# Patient Record
Sex: MALE | Race: WHITE | Employment: OTHER | URBAN - NONMETROPOLITAN AREA
[De-identification: names, ages, dates, MRNs, and addresses within clinical notes are randomized per-mention and may not be internally consistent; named-entity substitution may affect disease eponyms.]

---

## 2017-01-06 ENCOUNTER — OFFICE VISIT (OUTPATIENT)
Dept: CARE COORDINATION | Facility: OTHER | Age: 37
End: 2017-01-06
Attending: PSYCHIATRY & NEUROLOGY
Payer: COMMERCIAL

## 2017-01-06 DIAGNOSIS — R26.0 ATAXIC GAIT: Primary | ICD-10-CM

## 2017-01-10 ENCOUNTER — HOSPITAL ENCOUNTER (OUTPATIENT)
Dept: MRI IMAGING | Facility: HOSPITAL | Age: 37
Discharge: HOME | End: 2017-01-10
Attending: PSYCHIATRY & NEUROLOGY | Admitting: PSYCHIATRY & NEUROLOGY
Payer: COMMERCIAL

## 2017-01-10 PROCEDURE — 70553 MRI BRAIN STEM W/O & W/DYE: CPT | Mod: TC

## 2017-01-10 PROCEDURE — A9585 GADOBUTROL INJECTION: HCPCS | Mod: TC

## 2017-01-10 PROCEDURE — 72156 MRI NECK SPINE W/O & W/DYE: CPT | Mod: TC

## 2017-01-10 RX ORDER — GADOBUTROL 604.72 MG/ML
7.5 INJECTION INTRAVENOUS ONCE
Status: COMPLETED | OUTPATIENT
Start: 2017-01-10 | End: 2017-01-10

## 2017-01-10 RX ADMIN — GADOBUTROL 7.5 ML: 604.72 INJECTION INTRAVENOUS at 13:22

## 2017-02-28 ENCOUNTER — TRANSFERRED RECORDS (OUTPATIENT)
Dept: HEALTH INFORMATION MANAGEMENT | Facility: HOSPITAL | Age: 37
End: 2017-02-28

## 2017-03-28 ENCOUNTER — TRANSFERRED RECORDS (OUTPATIENT)
Dept: HEALTH INFORMATION MANAGEMENT | Facility: CLINIC | Age: 37
End: 2017-03-28

## 2017-05-09 ENCOUNTER — TRANSFERRED RECORDS (OUTPATIENT)
Dept: HEALTH INFORMATION MANAGEMENT | Facility: CLINIC | Age: 37
End: 2017-05-09

## 2017-05-31 ENCOUNTER — OFFICE VISIT (OUTPATIENT)
Dept: FAMILY MEDICINE | Facility: OTHER | Age: 37
End: 2017-05-31
Attending: FAMILY MEDICINE
Payer: COMMERCIAL

## 2017-05-31 VITALS
RESPIRATION RATE: 20 BRPM | OXYGEN SATURATION: 99 % | SYSTOLIC BLOOD PRESSURE: 128 MMHG | WEIGHT: 174 LBS | BODY MASS INDEX: 23.6 KG/M2 | HEART RATE: 103 BPM | DIASTOLIC BLOOD PRESSURE: 78 MMHG

## 2017-05-31 DIAGNOSIS — G40.909 SEIZURE DISORDER (H): Primary | ICD-10-CM

## 2017-05-31 PROCEDURE — 99213 OFFICE O/P EST LOW 20 MIN: CPT | Performed by: FAMILY MEDICINE

## 2017-05-31 RX ORDER — LEVETIRACETAM 250 MG/1
500 TABLET ORAL 2 TIMES DAILY
COMMUNITY
End: 2018-04-24

## 2017-05-31 ASSESSMENT — PAIN SCALES - GENERAL: PAINLEVEL: NO PAIN (0)

## 2017-05-31 ASSESSMENT — ANXIETY QUESTIONNAIRES
2. NOT BEING ABLE TO STOP OR CONTROL WORRYING: NOT AT ALL
7. FEELING AFRAID AS IF SOMETHING AWFUL MIGHT HAPPEN: NOT AT ALL
GAD7 TOTAL SCORE: 2
3. WORRYING TOO MUCH ABOUT DIFFERENT THINGS: SEVERAL DAYS
6. BECOMING EASILY ANNOYED OR IRRITABLE: NOT AT ALL
5. BEING SO RESTLESS THAT IT IS HARD TO SIT STILL: NOT AT ALL
1. FEELING NERVOUS, ANXIOUS, OR ON EDGE: NOT AT ALL
IF YOU CHECKED OFF ANY PROBLEMS ON THIS QUESTIONNAIRE, HOW DIFFICULT HAVE THESE PROBLEMS MADE IT FOR YOU TO DO YOUR WORK, TAKE CARE OF THINGS AT HOME, OR GET ALONG WITH OTHER PEOPLE: SOMEWHAT DIFFICULT

## 2017-05-31 ASSESSMENT — PATIENT HEALTH QUESTIONNAIRE - PHQ9: 5. POOR APPETITE OR OVEREATING: SEVERAL DAYS

## 2017-05-31 NOTE — MR AVS SNAPSHOT
"              After Visit Summary   2017    Mian Greenfield    MRN: [id number]           Patient Information     Date Of Birth          1980        Visit Information        Provider Department      2017 10:40 AM Torsten Lawrence MD [facility]        Today's Diagnoses     Seizure disorder (H)    -  1      Care Instructions    Form completed          Follow-ups after your visit        Follow-up notes from your care team     Return if symptoms worsen or fail to improve.      Who to contact     If you have questions or need follow up information about today's clinic visit or your schedule please contact [facility] directly at [phone].  Normal or non-critical lab and imaging results will be communicated to you by MyChart, letter or phone within 4 business days after the clinic has received the results. If you do not hear from us within 7 days, please contact the clinic through MyChart or phone. If you have a critical or abnormal lab result, we will notify you by phone as soon as possible.  Submit refill requests through [facility] or call your pharmacy and they will forward the refill request to us. Please allow 3 business days for your refill to be completed.          Additional Information About Your Visit        MyChart Information     [facility] lets you send messages to your doctor, view your test results, renew your prescriptions, schedule appointments and more. To sign up, go to [email or web address] . Click on \"Log in\" on the left side of the screen, which will take you to the Welcome page. Then click on \"Sign up Now\" on the right side of the page.     You will be asked to enter the access code listed below, as well as some personal information. Please follow the directions to create your username and password.     Your access code is: [email or web address]  Expires: 2017 10:38 AM     Your access code will  in 90 days. If you need help or a new code, please call your " [facility] or [phone].        Care EveryWhere ID     This is your Care EveryWhere ID. This could be used by other organizations to access your [place] medical records  [id number]        Your Vitals Were     Pulse Respirations Pulse Oximetry BMI (Body Mass Index)          103 20 99% 23.6 kg/m2         Blood Pressure from Last 3 Encounters:   05/31/17 128/78   11/30/16 136/88   04/18/16 130/80    Weight from Last 3 Encounters:   05/31/17 174 lb (78.9 kg)   11/30/16 170 lb (77.1 kg)   04/18/16 185 lb (83.9 kg)              Today, you had the following     No orders found for display       Primary Care Provider Office Phone # Fax #    Althea Culp [phone]       [facility] [address] [place] [address] [place] [address]        Thank you!     Thank you for choosing [facility]  for your care. Our goal is always to provide you with excellent care. Hearing back from our patients is one way we can continue to improve our services. Please take a few minutes to complete the written survey that you may receive in the mail after your visit with us. Thank you!             Your Updated Medication List - Protect others around you: Learn how to safely use, store and throw away your medicines at www.disposemymeds.org.          This list is accurate as of: 5/31/17 11:59 PM.  Always use your most recent med list.                   Brand Name Dispense Instructions for use    levETIRAcetam 250 MG tablet    KEPPRA     Take 250 mg by mouth 2 times daily       multivitamin, therapeutic with minerals Tabs tablet     30 each    Take 1 tablet by mouth daily

## 2017-05-31 NOTE — PROGRESS NOTES
SUBJECTIVE:  Mian Greenfield, 37 year old, male is seen in follow up of seizure disorder.  He has been on Lamictal and has had several episodes of dizziness and unsteadiness. This resulted in him being picked up at work.  He has been taking his medications.  No premonitory symptoms. He has been seen by Neurology.  Driving form to be completed.    Denies fever, headache, visual disturbance, focal weakness, numbness, tingling, paresthesias, tremor, or gait disturbance.      Current Outpatient Prescriptions   Medication Sig Dispense Refill     levETIRAcetam (KEPPRA) 250 MG tablet Take 250 mg by mouth 2 times daily       multivitamin, therapeutic with minerals (THERA-VIT-M) TABS Take 1 tablet by mouth daily 30 each 0        Allergies   Allergen Reactions     Bee Venom        Past Medical History:   Diagnosis Date     Alcohol abuse 10/3/2013     Insomnia 7/29/2015     Raynaud's syndrome 01/31/2011     Scoliosis (and kyphoscoliosis), idiopathic 04/03/2001     Seizure disorder (H) 7/29/2015    Dr chavez; Lamictal; after 2 years, can try tapering     Past Surgical History:   Procedure Laterality Date     NO HISTORY OF SURGERY       Family History   Problem Relation Age of Onset     Depression Mother      Thyroid Disease Sister      Thyroid Disease Sister      Blood Disease Father      bacterial infection     Alcohol/Drug Father      Social History     Social History     Marital status: Single     Spouse name: N/A     Number of children: N/A     Years of education: N/A     Occupational History     Not on file.     Social History Main Topics     Smoking status: Never Smoker     Smokeless tobacco: Never Used     Alcohol use Yes      Comment: daily use often to intoxication.  Currently in recovery (9/10/2014) Had 1 relapse.     Drug use: No     Sexual activity: Yes     Partners: Female     Other Topics Concern     Parent/Sibling W/ Cabg, Mi Or Angioplasty Before 65f 55m? No      Service No     Blood Transfusions Yes      Permits if needed     Caffeine Concern Yes     1 soda     Occupational Exposure No     Hobby Hazards No     Sleep Concern No     Stress Concern No     Weight Concern No     Special Diet No     Back Care No     Exercise No     Seat Belt Yes     Social History Narrative    HS graduate. No college. 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 Recently . One child who is 2 years old.          Review Of Systems  Constitutional, HEENT, cardiovascular, pulmonary, gi and gu systems are negative, except as otherwise noted.    OBJECTIVE:  Vitals: B/P: 136/88, T: 98.2, P: 119, R: 16    Exam:  Physical Exam   Constitutional: He is oriented to person, place, and time. He appears well-developed and well-nourished. No distress.   Neurological: He is alert and oriented to person, place, and time.   Psychiatric: He has a normal mood and affect.     Other exam not repeated    Labs:  Office Visit on 07/29/2015   Component Date Value Ref Range Status     AST 07/29/2015 28  0 - 45 U/L Final     ALT 07/29/2015 58  0 - 70 U/L Final       ASSESSMENT/PLAN:  Seizure disorder (H)   Appointment with Neurology scheduled for evaluation and recommendations for further management.               Torsten Lawrence MD

## 2017-05-31 NOTE — NURSING NOTE
Chief Complaint   Patient presents with     Forms     needs form completed for the DMV.      Seizures     follow up from visit with Mary       Initial /78  Pulse 103  Resp 20  Wt 174 lb (78.9 kg)  SpO2 99%  BMI 23.6 kg/m2 Estimated body mass index is 23.6 kg/(m^2) as calculated from the following:    Height as of 4/18/16: 6' (1.829 m).    Weight as of this encounter: 174 lb (78.9 kg).  Medication Reconciliation: complete   Jailene Montenegro

## 2017-06-01 ASSESSMENT — ANXIETY QUESTIONNAIRES: GAD7 TOTAL SCORE: 2

## 2017-06-01 ASSESSMENT — PATIENT HEALTH QUESTIONNAIRE - PHQ9: SUM OF ALL RESPONSES TO PHQ QUESTIONS 1-9: 2

## 2017-06-14 ENCOUNTER — TELEPHONE (OUTPATIENT)
Dept: FAMILY MEDICINE | Facility: OTHER | Age: 37
End: 2017-06-14

## 2017-06-14 NOTE — TELEPHONE ENCOUNTER
9:13 AM    Reason for Call: Phone Call    Description: Mian would like a call back about his return to work for [employer]    Was an appointment offered for this call? No    Preferred method for responding to this message: [phone]    If we cannot reach you directly, may we leave a detailed response at the number you provided? Yes    Can this message wait until your PCP/provider returns, if available today?  Yes , Patient was informed that Dr Lawrence is out until Monday    Liana Cruz

## 2017-08-31 ENCOUNTER — PRE VISIT (OUTPATIENT)
Dept: NEUROLOGY | Facility: CLINIC | Age: 37
End: 2017-08-31

## 2017-08-31 NOTE — TELEPHONE ENCOUNTER
1.  Date/reason for appt:  9/12/17   Abnormal Movements    2.  Referring provider:  Shruthi Levy  --  Records are in [facility]; Called and requested imaging be pushed down asap    3.  Call to patient (Yes / No - short description):  No referred  Previously followed [facility] [place]  --  Records are in [facility] (verified no addl records)    Records reviewed.  All records are in [facility] and imaging is in PACS.

## 2017-09-12 ENCOUNTER — OFFICE VISIT (OUTPATIENT)
Dept: NEUROLOGY | Facility: CLINIC | Age: 37
End: 2017-09-12

## 2017-09-12 VITALS — HEART RATE: 93 BPM | DIASTOLIC BLOOD PRESSURE: 90 MMHG | SYSTOLIC BLOOD PRESSURE: 143 MMHG | HEIGHT: 72 IN

## 2017-09-12 DIAGNOSIS — G40.309 NONINTRACTABLE GENERALIZED IDIOPATHIC EPILEPSY WITHOUT STATUS EPILEPTICUS (H): ICD-10-CM

## 2017-09-12 DIAGNOSIS — R27.0 ATAXIA: Primary | ICD-10-CM

## 2017-09-12 ASSESSMENT — ENCOUNTER SYMPTOMS
NUMBNESS: 0
WEIGHT LOSS: 0
INCREASED ENERGY: 1
HEADACHES: 0
DEPRESSION: 1
CHILLS: 0
SEIZURES: 0
NERVOUS/ANXIOUS: 0
PARALYSIS: 0
NIGHT SWEATS: 1
POLYDIPSIA: 0
LOSS OF CONSCIOUSNESS: 0
DECREASED APPETITE: 1
DECREASED CONCENTRATION: 0
WEIGHT GAIN: 0
SPEECH CHANGE: 1
ALTERED TEMPERATURE REGULATION: 0
FATIGUE: 1
WEAKNESS: 1
DIZZINESS: 1
MEMORY LOSS: 0
DISTURBANCES IN COORDINATION: 1
TINGLING: 0
INSOMNIA: 1
PANIC: 0
TREMORS: 1
FEVER: 0
HALLUCINATIONS: 0
POLYPHAGIA: 0

## 2017-09-12 ASSESSMENT — PAIN SCALES - GENERAL: PAINLEVEL: NO PAIN (0)

## 2017-09-12 NOTE — MR AVS SNAPSHOT
After Visit Summary   9/12/2017    Mian Greenfield    MRN: [id number]           Patient Information     Date Of Birth          1980        Visit Information        Provider Department      9/12/2017 3:00 PM Anthony Velázquez MD [facility] Neurology        Today's Diagnoses     Ataxia    -  1    Nonintractable generalized idiopathic epilepsy without status epilepticus (H)           Follow-ups after your visit        Additional Services     NEUROLOGY ADULT REFERRAL       Your provider has referred you for the following:   Ataxia clinic    Please be aware that coverage of these services is subject to the terms and limitations of your health insurance plan.  Call member services at your health plan with any benefit or coverage questions.      Please bring the following with you to your appointment:    (1) Any X-Rays, CTs or MRIs which have been performed.  Contact the facility where they were done to arrange for  prior to your scheduled appointment.    (2) List of current medications  (3) This referral request   (4) Any documents/labs given to you for this referral                  Follow-up notes from your care team     Return in about 4 weeks (around 10/10/2017).      Future tests that were ordered for you today     Open Future Orders        Priority Expected Expires Ordered    Vitamin B1 whole blood Routine  9/12/2018 9/12/2017    Lyme Disease Nini with reflex to WB Serum Routine  9/12/2018 9/12/2017    Vitamin B12 Routine  9/12/2018 9/12/2017    Ceruloplasmin Routine  9/12/2018 9/12/2017    CBC with platelets Routine  9/12/2018 9/12/2017    Vitamin E Routine  9/12/2018 9/12/2017    Erythrocyte sedimentation rate auto Routine  9/12/2018 9/12/2017    EEG ROUTINE (41907) Routine  10/10/2017 9/12/2017    Lipid panel Routine  9/12/2018 9/12/2017            Who to contact     Please call your clinic at [phone] to:    Ask questions about your health    Make or cancel appointments    Discuss your  medicines    Learn about your test results    Speak to your doctor   If you have compliments or concerns about an experience at your clinic, or if you wish to file a complaint, please contact [facility] Patient Relations at [phone] or email us at [email or web address]         Additional Information About Your Visit        [employer] Information     [employer] is an electronic gateway that provides easy, online access to your medical records. With [employer], you can request a clinic appointment, read your test results, renew a prescription or communicate with your care team.     To sign up for [employer] visit the website at [email or web address]   You will be asked to enter the access code listed below, as well as some personal information. Please follow the directions to create your username and password.     Your access code is: [email or web address]  Expires: 2017  4:49 PM     Your access code will  in 90 days. If you need help or a new code, please contact your [facility] or call [phone] for assistance.        Care EveryWhere ID     This is your Care EveryWhere ID. This could be used by other organizations to access your [place] medical records  [id number]        Your Vitals Were     Pulse Height                93 1.829 m (6')           Blood Pressure from Last 3 Encounters:   17 143/90   17 128/78   16 136/88    Weight from Last 3 Encounters:   17 78.9 kg (174 lb)   16 77.1 kg (170 lb)   16 83.9 kg (185 lb)              We Performed the Following     Keppra (Levetiracetam) Level     NEUROLOGY ADULT REFERRAL     [place] virus RNA by PCR        Primary Care Provider Office Phone # Fax #    Torsten Lawrecne [phone]       [facility] [address] [place] [address]        Equal Access to Services     CANDIE ROSS : Guicho Perea, piper walker, temoybalfredo  jess montesbritt ballard. So [facility] [phone].    ATENCIÓN: Si singh donohue, tiene a hopkins disposición servicios gratuitos de asistencia lingüística. Meliton al [phone].    We comply with applicable federal civil rights laws and [place] laws. We do not discriminate on the basis of race, color, national origin, age, disability sex, sexual orientation or gender identity.            Thank you!     Thank you for choosing [facility]  for your care. Our goal is always to provide you with excellent care. Hearing back from our patients is one way we can continue to improve our services. Please take a few minutes to complete the written survey that you may receive in the mail after your visit with us. Thank you!             Your Updated Medication List - Protect others around you: Learn how to safely use, store and throw away your medicines at www.disposemymeds.org.          This list is accurate as of: 9/12/17  4:49 PM.  Always use your most recent med list.                   Brand Name Dispense Instructions for use Diagnosis    levETIRAcetam 250 MG tablet    KEPPRA     Take 500 mg by mouth 2 times daily        multivitamin, therapeutic with minerals Tabs tablet     30 each    Take 1 tablet by mouth daily    Alcohol abuse
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
## 2017-09-14 ENCOUNTER — TELEPHONE (OUTPATIENT)
Dept: NEUROLOGY | Facility: CLINIC | Age: 37
End: 2017-09-14

## 2017-09-14 NOTE — TELEPHONE ENCOUNTER
Form was received that was mailed into the clinic request and medical opinion. Form filled out placed in the M.D.'s folder for signature.

## 2017-09-19 ENCOUNTER — TELEPHONE (OUTPATIENT)
Dept: NEUROLOGY | Facility: CLINIC | Age: 37
End: 2017-09-19

## 2017-09-19 NOTE — TELEPHONE ENCOUNTER
"Called pt to arrange an appointment with  in the Ataxia Clinic. He said he doesn't have insurance and wants to make sure that he does have insurance before he make an appointment.  He is going to speak to his \"worker\" tomorrow and he asked to be called back on Friday in regards to scheduling.  "

## 2017-09-26 ENCOUNTER — TELEPHONE (OUTPATIENT)
Dept: NEUROLOGY | Facility: CLINIC | Age: 37
End: 2017-09-26

## 2017-09-26 NOTE — TELEPHONE ENCOUNTER
Called pt, no answer, left message in regards to scheduling pt as a new ataxia pt, pt did not answer.

## 2017-09-28 ENCOUNTER — TELEPHONE (OUTPATIENT)
Dept: FAMILY MEDICINE | Facility: OTHER | Age: 37
End: 2017-09-28

## 2017-09-28 NOTE — TELEPHONE ENCOUNTER
Dr. Anthony Velázquez from [facility] called regarding pt he saw. He states he did not return for labs or appt and would like to discuss him with you. Please call back at [phone].

## 2017-10-03 RX ORDER — LANOLIN ALCOHOL/MO/W.PET/CERES
100 CREAM (GRAM) TOPICAL DAILY
Qty: 1 TABLET | Refills: 3 | Status: SHIPPED | OUTPATIENT
Start: 2017-10-03 | End: 2021-03-24

## 2017-10-24 ENCOUNTER — TELEPHONE (OUTPATIENT)
Dept: NEUROLOGY | Facility: CLINIC | Age: 37
End: 2017-10-24

## 2017-10-24 NOTE — TELEPHONE ENCOUNTER
Called patient again to try and arrange an appointment. No answer, left voice mail to return call.

## 2018-04-18 ENCOUNTER — OFFICE VISIT (OUTPATIENT)
Dept: FAMILY MEDICINE | Facility: OTHER | Age: 38
End: 2018-04-18
Attending: FAMILY MEDICINE
Payer: COMMERCIAL

## 2018-04-18 VITALS
TEMPERATURE: 98.6 F | WEIGHT: 183 LBS | HEART RATE: 97 BPM | DIASTOLIC BLOOD PRESSURE: 82 MMHG | BODY MASS INDEX: 24.82 KG/M2 | OXYGEN SATURATION: 99 % | SYSTOLIC BLOOD PRESSURE: 130 MMHG

## 2018-04-18 DIAGNOSIS — G40.909 SEIZURE DISORDER (H): Primary | ICD-10-CM

## 2018-04-18 PROCEDURE — G0463 HOSPITAL OUTPT CLINIC VISIT: HCPCS

## 2018-04-18 PROCEDURE — 99214 OFFICE O/P EST MOD 30 MIN: CPT | Performed by: FAMILY MEDICINE

## 2018-04-18 ASSESSMENT — ANXIETY QUESTIONNAIRES
3. WORRYING TOO MUCH ABOUT DIFFERENT THINGS: SEVERAL DAYS
1. FEELING NERVOUS, ANXIOUS, OR ON EDGE: SEVERAL DAYS
IF YOU CHECKED OFF ANY PROBLEMS ON THIS QUESTIONNAIRE, HOW DIFFICULT HAVE THESE PROBLEMS MADE IT FOR YOU TO DO YOUR WORK, TAKE CARE OF THINGS AT HOME, OR GET ALONG WITH OTHER PEOPLE: SOMEWHAT DIFFICULT
6. BECOMING EASILY ANNOYED OR IRRITABLE: SEVERAL DAYS
2. NOT BEING ABLE TO STOP OR CONTROL WORRYING: SEVERAL DAYS
4. TROUBLE RELAXING: SEVERAL DAYS
7. FEELING AFRAID AS IF SOMETHING AWFUL MIGHT HAPPEN: SEVERAL DAYS
5. BEING SO RESTLESS THAT IT IS HARD TO SIT STILL: SEVERAL DAYS
GAD7 TOTAL SCORE: 7

## 2018-04-18 ASSESSMENT — PAIN SCALES - GENERAL: PAINLEVEL: NO PAIN (0)

## 2018-04-18 NOTE — MR AVS SNAPSHOT
"              After Visit Summary   2018    Mian Greenfield    MRN: [id number]           Patient Information     Date Of Birth          1980        Visit Information        Provider Department      2018 8:40 AM Torsten Lawrence MD [facility]        Today's Diagnoses     Seizure disorder (H)    -  1      Care Instructions    Continue same medications.            Follow-ups after your visit        Follow-up notes from your care team     Return in about 6 months (around 10/18/2018) for Follow up visit.      Who to contact     If you have questions or need follow up information about today's clinic visit or your schedule please contact [facility] directly at [phone].  Normal or non-critical lab and imaging results will be communicated to you by MyChart, letter or phone within 4 business days after the clinic has received the results. If you do not hear from us within 7 days, please contact the clinic through MyChart or phone. If you have a critical or abnormal lab result, we will notify you by phone as soon as possible.  Submit refill requests through [employer] or call your pharmacy and they will forward the refill request to us. Please allow 3 business days for your refill to be completed.          Additional Information About Your Visit        MyChart Information     [employer] lets you send messages to your doctor, view your test results, renew your prescriptions, schedule appointments and more. To sign up, go to [email or web address] . Click on \"Log in\" on the left side of the screen, which will take you to the Welcome page. Then click on \"Sign up Now\" on the right side of the page.     You will be asked to enter the access code listed below, as well as some personal information. Please follow the directions to create your username and password.     Your access code is: [email or web address]  Expires: 2018  5:16 AM     Your access code will  in 90 days. If you need " help or a new code, please call your [facility] clinic or [phone].        Care EveryWhere ID     This is your Care EveryWhere ID. This could be used by other organizations to access your [facility] medical records  [id number]        Your Vitals Were     Pulse Temperature Pulse Oximetry BMI (Body Mass Index)          97 98.6  F (37  C) (Tympanic) 99% 24.82 kg/m2         Blood Pressure from Last 3 Encounters:   04/18/18 130/82   09/12/17 143/90   05/31/17 128/78    Weight from Last 3 Encounters:   04/18/18 183 lb (83 kg)   05/31/17 174 lb (78.9 kg)   11/30/16 170 lb (77.1 kg)              Today, you had the following     No orders found for display         Today's Medication Changes          These changes are accurate as of 4/18/18 11:59 PM.  If you have any questions, ask your nurse or doctor.               These medicines have changed or have updated prescriptions.        Dose/Directions    levETIRAcetam 500 MG tablet   Commonly known as:  KEPPRA   This may have changed:  medication strength   Used for:  Seizure disorder (H)   Changed by:  Torsten Lawrence MD        Dose:  500 mg   Take 1 tablet (500 mg) by mouth 2 times daily   Quantity:  180 tablet   Refills:  1            Where to get your medicines      These medications were sent to [facility]- [place], [place] - [address] [address]     Phone:  [phone]     levETIRAcetam 500 MG tablet                Primary Care Provider Office Phone # Fax #    Torsten Lawrence [phone]       [address] [place] [address]        Equal Access to Services     [facility] AH: Hadii oriana Perea, waerinda luqadaha, qaybta kaalmajess alvarado. So [facility] [phone].    ATENCIÓN: Si habla español, tiene a hopkins disposición servicios gratuitos de asistencia lingüística. Llame al [phone].    We comply with applicable federal civil rights laws  and [place] laws. We do not discriminate on the basis of race, color, national origin, age, disability, sex, sexual orientation, or gender identity.            Thank you!     Thank you for choosing [facility]  [facility] your [facility]. Our goal is always to provide you with excellent care. Hearing back from our patients is one way we can continue to improve our services. Please take a few minutes to complete the written survey that you may receive in the mail after your visit with us. Thank you!             Your Updated Medication List - Protect others around you: Learn how to safely use, store and throw away your medicines at www.disposemymeds.org.          This list is accurate as of 4/18/18 11:59 PM.  Always use your most recent med list.                   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

## 2018-04-18 NOTE — LETTER
[facility] [place]  [address]  [facility] [address]  Phone: [phone]    April 18, 2018        Mian Greenfield  [address] [facility] [address]          To whom it may concern:    RE: Mian Greenfield    Patient was seen and treated today at our clinic.  Mian suffers from seizure disorder with abnormal movement.  This has been present over the last several months and he has been undergoing Neurological evaluation and has been referred to the [facility].  He is now undergoing disability evaluation.  Because of the nature of his illness he is unable to work.  This has resulted in a limited income for Mian.    I appreciate any help you might be able to give him.    Please contact me for questions or concerns.      Sincerely,        Torsten Lawrence MD

## 2018-04-18 NOTE — NURSING NOTE
Chief Complaint   Patient presents with     Seizures     Follow up would like to discuss recent appointment. Denies and recent seizures.        Initial /82 (BP Location: Right arm, Patient Position: Chair, Cuff Size: Adult Regular)  Pulse 97  Temp 98.6  F (37  C) (Tympanic)  Wt 183 lb (83 kg)  SpO2 99%  BMI 24.82 kg/m2 Estimated body mass index is 24.82 kg/(m^2) as calculated from the following:    Height as of 9/12/17: 6' (1.829 m).    Weight as of this encounter: 183 lb (83 kg).  Medication Reconciliation: complete   EDUARDO MCELROY

## 2018-04-18 NOTE — PROGRESS NOTES
SUBJECTIVE:   Mian Greenfield is a 38 year old male who presents to clinic today for the following health issues:    Seizure disorder      Duration: years    Description (location/character/radiation): abnormal movement and syncope    Intensity:  moderate    Accompanying signs and symptoms: As above    History (similar episodes/previous evaluation): None    Precipitating or alleviating factors: None    Therapies tried and outcome: Medications     Mian has a history of seizure disorder and has been seen by Neurology.  Most recently he will be evaluated at the [facility].  Artur also has been filing for disability.  Records are reviewed.    Problem list and histories reviewed & adjusted, as indicated.  Additional history: as documented    Patient Active Problem List   Diagnosis     Major depression, recurrent (H)     Suicidal ideation     Alcohol abuse     Suicidal behavior     Seizure disorder (H)     Insomnia     Past Surgical History:   Procedure Laterality Date     NO HISTORY OF SURGERY         Social History   Substance Use Topics     Smoking status: Never Smoker     Smokeless tobacco: Never Used     Alcohol use Yes      Comment: daily use often to intoxication.  Currently in recovery (9/10/2014) Had 1 relapse.     Family History   Problem Relation Age of Onset     Depression Mother      Blood Disease Father      bacterial infection     Alcohol/Drug Father      Thyroid Disease Sister      Thyroid Disease Sister          Current Outpatient Prescriptions   Medication Sig Dispense Refill     levETIRAcetam (KEPPRA) 250 MG tablet Take 500 mg by mouth 2 times daily        multivitamin, therapeutic with minerals (THERA-VIT-M) TABS Take 1 tablet by mouth daily 30 each 0     thiamine 100 MG tablet Take 1 tablet (100 mg) by mouth daily 1 tablet 3     Allergies   Allergen Reactions     Bee Venom        Reviewed and updated as needed this visit by clinical staff  Tobacco  Allergies  Meds  Med Hx  Surg Hx  Fam Hx   Soc Hx      Reviewed and updated as needed this visit by Provider         ROS:  Constitutional, HEENT, cardiovascular, pulmonary, gi and gu systems are negative, except as otherwise noted.    OBJECTIVE:     /82 (BP Location: Right arm, Patient Position: Chair, Cuff Size: Adult Regular)  Pulse 97  Temp 98.6  F (37  C) (Tympanic)  Wt 183 lb (83 kg)  SpO2 99%  BMI 24.82 kg/m2  Body mass index is 24.82 kg/(m^2).  Physical Exam   Constitutional: He is oriented to person, place, and time. He appears well-developed and well-nourished. No distress.   Neurological: He is alert and oriented to person, place, and time.   Psychiatric: He has a normal mood and affect.       Other exam not repeated.  Diagnostic Test Results:  none     ASSESSMENT/PLAN:       Seizure disorder (H)  Discussed ongoing evaluations and disability.  He requests letter for court appearance.  Refilled Keppra as written.  Follow up with Neurology.  - levETIRAcetam (KEPPRA) 500 MG tablet; Take 1 tablet (500 mg) by mouth 2 times daily    Greater than 25 minutes spent with patient, of which greater than 50% was spent reviewing pertinent medical records., counseling regarding seizure evaluation, disability hearings, medication managment, as well as coordination of care.          Torsten Lawrence MD  [facility]

## 2018-04-19 ASSESSMENT — PATIENT HEALTH QUESTIONNAIRE - PHQ9: SUM OF ALL RESPONSES TO PHQ QUESTIONS 1-9: 5

## 2018-04-19 ASSESSMENT — ANXIETY QUESTIONNAIRES: GAD7 TOTAL SCORE: 7

## 2018-04-23 ENCOUNTER — TELEPHONE (OUTPATIENT)
Dept: FAMILY MEDICINE | Facility: OTHER | Age: 38
End: 2018-04-23

## 2018-04-23 NOTE — TELEPHONE ENCOUNTER
11:29 AM    Reason for Call: Phone Call    Description: Pt called and states that he would like a call back regarding some questions from his last visit.    Was an appointment offered for this call? No  If yes : Appointment type              Date    Preferred method for responding to this message: Telephone  What is your phone number ?[phone]    If we cannot reach you directly, may we leave a detailed response at the number you provided? Yes    Can this message wait until your PCP/provider returns, if available today? Not applicable, PCP is in     Jamila Sebewaing

## 2018-04-24 RX ORDER — LEVETIRACETAM 500 MG/1
500 TABLET ORAL 2 TIMES DAILY
Qty: 180 TABLET | Refills: 1 | Status: SHIPPED | OUTPATIENT
Start: 2018-04-24 | End: 2018-05-29

## 2018-04-24 RX ORDER — LEVETIRACETAM 250 MG/1
500 TABLET ORAL 2 TIMES DAILY
Qty: 120 TABLET | Refills: 0 | Status: CANCELLED | OUTPATIENT
Start: 2018-04-24

## 2018-04-24 NOTE — TELEPHONE ENCOUNTER
Patient states he was waiting for a letter for court stating whether he can work or not. Has court tomorrow and needs it by end of day. Also said you wee going to fill his Keppra and it was not sent in. Pended if ok.

## 2018-05-29 ENCOUNTER — OFFICE VISIT (OUTPATIENT)
Dept: NEUROLOGY | Facility: CLINIC | Age: 38
End: 2018-05-29
Payer: COMMERCIAL

## 2018-05-29 ENCOUNTER — TELEPHONE (OUTPATIENT)
Dept: NEUROLOGY | Facility: CLINIC | Age: 38
End: 2018-05-29

## 2018-05-29 VITALS
DIASTOLIC BLOOD PRESSURE: 84 MMHG | WEIGHT: 180.3 LBS | BODY MASS INDEX: 24.42 KG/M2 | SYSTOLIC BLOOD PRESSURE: 127 MMHG | HEIGHT: 72 IN | HEART RATE: 98 BPM

## 2018-05-29 DIAGNOSIS — G40.909 SEIZURE DISORDER (H): ICD-10-CM

## 2018-05-29 DIAGNOSIS — R27.9 LACK OF COORDINATION: Primary | ICD-10-CM

## 2018-05-29 DIAGNOSIS — R27.0 ATAXIA: ICD-10-CM

## 2018-05-29 DIAGNOSIS — G40.309 NONINTRACTABLE GENERALIZED IDIOPATHIC EPILEPSY WITHOUT STATUS EPILEPTICUS (H): ICD-10-CM

## 2018-05-29 LAB
CHOLEST SERPL-MCNC: 173 MG/DL
ERYTHROCYTE [DISTWIDTH] IN BLOOD BY AUTOMATED COUNT: 13.2 % (ref 10–15)
ERYTHROCYTE [SEDIMENTATION RATE] IN BLOOD BY WESTERGREN METHOD: 5 MM/H (ref 0–15)
HCT VFR BLD AUTO: 49.9 % (ref 40–53)
HDLC SERPL-MCNC: 36 MG/DL
HGB BLD-MCNC: 16.5 G/DL (ref 13.3–17.7)
LDLC SERPL CALC-MCNC: 115 MG/DL
MCH RBC QN AUTO: 29.5 PG (ref 26.5–33)
MCHC RBC AUTO-ENTMCNC: 33.1 G/DL (ref 31.5–36.5)
MCV RBC AUTO: 89 FL (ref 78–100)
NONHDLC SERPL-MCNC: 136 MG/DL
PLATELET # BLD AUTO: 302 10E9/L (ref 150–450)
RBC # BLD AUTO: 5.6 10E12/L (ref 4.4–5.9)
TRIGL SERPL-MCNC: 107 MG/DL
VIT B12 SERPL-MCNC: 321 PG/ML (ref 193–986)
WBC # BLD AUTO: 6.7 10E9/L (ref 4–11)

## 2018-05-29 RX ORDER — LEVETIRACETAM 500 MG/1
500 TABLET ORAL 2 TIMES DAILY
Qty: 180 TABLET | Refills: 11 | Status: SHIPPED | OUTPATIENT
Start: 2018-05-29 | End: 2018-07-06

## 2018-05-29 ASSESSMENT — PAIN SCALES - GENERAL: PAINLEVEL: MILD PAIN (2)

## 2018-05-29 NOTE — TELEPHONE ENCOUNTER
Pt is referred to the ataxia clinic by Dr. Cruz. He was called and an appointment was made for July 20 at 11am.

## 2018-05-29 NOTE — MR AVS SNAPSHOT
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  Thanks  Kenyatta Velázquez M.D.     May 28. 2018.          Follow-ups after your visit        Additional Services     NEUROLOGY ADULT REFERRAL       Your provider has referred you for the following:   [facility]    Please be aware that coverage of these services is subject to the terms and limitations of your health insurance plan.  Call member services at your health plan with any benefit or coverage questions.      Please bring the following with you to your appointment:    (1) Any X-Rays, CTs or MRIs which have been performed.  Contact the facility where they were done to arrange for  prior to your scheduled appointment.    (2) List of current medications  (3) This referral request   (4) Any documents/labs given to you for this referral                  Follow-up notes from your care team     Return in about 3 months (around 2018).      Your next 10 appointments already scheduled     May 29, 2018  3:00 PM CDT   (Arrive by 2:45 PM)   Return Visit with Kenyatta Velázquez MD   [facility] Neurology ([facility])    [address] [place] [address] [phone]            Aug 28, 2018 11:00 AM CDT   (Arrive by 10:45 AM)   Return Visit with Kenyatta Velázquez MD   [facility] ([facility])    [address] [place] [address]    [phone]              Future tests that were ordered for you today     Open Future Orders        Priority Expected Expires Ordered    EEG ROUTINE (65729) Routine  6/26/2018 5/29/2018            Who to contact     Please call your clinic at [phone] to:    Ask questions about your health    Make or cancel appointments    Discuss your medicines    Learn about your test results    Speak to your doctor            Additional Information About Your Visit        Care EveryWhere ID     This is your Care EveryWhere ID. This could be used by other organizations to access your [facility] medical records  [id number]        Your Vitals Were     Pulse Height BMI (Body Mass Index)             98 1.829 m (6') 24.45 kg/m2          Blood Pressure from Last 3 Encounters:   05/29/18 127/84   04/18/18 130/82   09/12/17 143/90    Weight from Last 3 Encounters:   05/29/18 81.8 kg (180 lb 4.8 oz)   04/18/18 83 kg (183 lb)   05/31/17 78.9 kg (174 lb)              We Performed the Following     NEUROLOGY ADULT REFERRAL          Where to get your medicines      These medications were sent to [facility]- [place], [place]  [place] - [address] [address]     Phone:  [phone]     levETIRAcetam 500 MG tablet          Primary Care Provider Office Phone # Fax #    Torsten Lawrence [phone]       [address] [place] [address]        Equal Access to Services     [facility] AH: Hadii oriana graveso Soamrit, waaxda luqadaha, qaybta kaalmada adeegyada, waxay fredy mora adebritt ballard. So [facility] [phone].    ATENCIÓN: Si habla español, tiene a hopkins disposición servicios gratuitos de asistencia lingüística. Llame al [phone].    We comply with applicable federal civil rights laws and [place] laws. We do not discriminate on the basis of race, color, national origin, age, disability, sex, sexual orientation, or gender identity.            Thank you!      Thank you for choosing [facility] NEUROLOGY  for your care. Our goal is always to provide you with excellent care. Hearing back from our patients is one way we can continue to improve our services. Please take a few minutes to complete the written survey that you may receive in the mail after your visit with us. Thank you!             Your Updated Medication List - Protect others around you: Learn how to safely use, store and throw away your medicines at www.disposemymeds.org.          This list is accurate as of 5/29/18  2:17 PM.  Always use your most recent med list.                   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

## 2018-05-29 NOTE — LETTER
Service Date: 2018      Torsten Lawrence MD   [facility]    [address] [place] [address]      RE: Mian Greenfield   MRN: [id number]   : 1980      Dear Dr. Lawrence:       Once again, I saw Mr. Greenfield.  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  Unfortunately, he did not have any insurance, so he could not have any testing after my 2017 visit, where we outlined an evaluation program for his ataxia, but now he has, so he came back.  We had attempted to get him into the [facility], but for the same reason, he was not able to.  Since the last visit, he says he has had no seizures.  His ataxia, he feels, has been slightly improved due to the fact he reduced his dose of levetiracetam from 1000-500 a day.  He is accompanied by his mother.  We re-reviewed the history, and it is pretty firm that all his problems started in  when he had an episode that was presumed to be a seizure, for which he was put initially on Lamictal and then changed over to Keppra that he is on now.  He says that since that time, his coordination has been a really big problem.  He is unable to do his work as an , and recently he has applied for disability.  Since his last visit, he says he had no other changes.  He feels a little better, and he is not having suicidal ideation or severe depression.  He is able to spend some time with his son.  He has not been drinking.  We have been unable to do any significant workup on his case.  His last MRI from 2017 of his brain and neck did not show any lesions.  He has had a previous MRI by Dr. Hernandez in , and this apparently had not shown a lesion literally.  He has not had any significant laboratory studies except back in  when he did have a Lamictal level and had had some other laboratory studies that go back to .  He has had alcohol issues, but he says he is dry now.  He has also had salicylate levels, not exactly sure  what for.  His other diagnoses are alcohol abuse, suicidal ideation, Raynaud phenomenon with cold weather and insomnia.      ALLERGIES:  Bee venom.      PHYSICAL EXAMINATION:  I reexamined him today.  His speech is still dysarthric.  His blood pressure is 127/84.  He has very profound truncal titubation and a wide-based gait.  He cannot do tandem.  There is some ataxia on finger-to-nose, more prominent on the left, and there is rapid alternating movement and mildly ataxic syndrome.  Nystagmus is not present.  He does have mild ptosis of the right eye and some conjunctival injection that he feels are allergies.  His fundi are benign.  His reflexes are very brisk, but it is not a jaw jerk.  His right-sided reflexes are brisker.  There are 2 beats of clonus.  No Babinski.  Sensory exam is relatively normal.      In summary, he has ataxia with a lot of titubation of his trunk, but some limb component.  He has a history of alcohol.  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  His MRI has not shown vermian atrophy.  He needs to have further studies done for ataxia, and I referred him again to Dr. Slaughter's clinic.  The labs which he could not have before have been done.  In the meantime, we gave him a paper that he is disabled from doing any substantial amount of work.      We will see how his workup progresses.  He probably will get disability.      Sincerely,      Anthony Velázquez MD       cc:   Alanna Slaughter MD   [facility]   [address] [place] [address]       D: 2018   T: 2018   MT: harry    Name:     KARENA BOWMAN   MRN:      [id number]        Account:      [id number]   :      1980           Service Date: 2018    Document: [id number]

## 2018-05-29 NOTE — PATIENT INSTRUCTIONS
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  Thanks  Kenyatta Velázquez M.D.  [phone]   May 28. 2018.

## 2018-05-30 LAB — B BURGDOR IGG+IGM SER QL: 0.04 (ref 0–0.89)

## 2018-05-30 NOTE — PROGRESS NOTES
Service Date: 2018      Torsten Lawrence MD   [facility]    [address] [place] [address]      RE: Mian Greenfield   MRN: [id number]   : 1980      Dear Dr. Lawrence:       Once again, I saw Mr. Greenfield.  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  Unfortunately, he did not have any insurance, so he could not have any testing after my 2017 visit, where we outlined an evaluation program for his ataxia, but now he has, so he came back.  We had attempted to get him into the [facility], but for the same reason, he was not able to.  Since the last visit, he says he has had no seizures.  His ataxia, he feels, has been slightly improved due to the fact he reduced his dose of levetiracetam from 1000-500 a day.  He is accompanied by his mother.  We re-reviewed the history, and it is pretty firm that all his problems started in  when he had an episode that was presumed to be a seizure, for which he was put initially on Lamictal and then changed over to Keppra that he is on now.  He says that since that time, his coordination has been a really big problem.  He is unable to do his work as an , and recently he has applied for disability.  Since his last visit, he says he had no other changes.  He feels a little better, and he is not having suicidal ideation or severe depression.  He is able to spend some time with his son.  He has not been drinking.  We have been unable to do any significant workup on his case.  His last MRI from 2017 of his brain and neck did not show any lesions.  He has had a previous MRI by Dr. Hernandez in , and this apparently had not shown a lesion literally.  He has not had any significant laboratory studies except back in  when he did have a Lamictal level and had had some other laboratory studies that go back to .  He has had alcohol issues, but he says he is dry now.  He has also had salicylate levels, not exactly sure  what for.  His other diagnoses are alcohol abuse, suicidal ideation, Raynaud phenomenon with cold weather and insomnia.      ALLERGIES:  Bee venom.      PHYSICAL EXAMINATION:  I reexamined him today.  His speech is still dysarthric.  His blood pressure is 127/84.  He has very profound truncal titubation and a wide-based gait.  He cannot do tandem.  There is some ataxia on finger-to-nose, more prominent on the left, and there is rapid alternating movement and mildly ataxic syndrome.  Nystagmus is not present.  He does have mild ptosis of the right eye and some conjunctival injection that he feels are allergies.  His fundi are benign.  His reflexes are very brisk, but it is not a jaw jerk.  His right-sided reflexes are brisker.  There are 2 beats of clonus.  No Babinski.  Sensory exam is relatively normal.      In summary, he has ataxia with a lot of titubation of his trunk, but some limb component.  He has a history of alcohol.  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  His MRI has not shown vermian atrophy.  He needs to have further studies done for ataxia, and I referred him again to Dr. Slaughter's clinic.  The labs which he could not have before have been done.  In the meantime, we gave him a paper that he is disabled from doing any substantial amount of work.      We will see how his workup progresses.  He probably will get disability.      Sincerely,      Anthony Mcdonnell MD       cc:   Alanna Slaughter MD   [facility]   [address] [place] [address]         ANTHONY MCDONNELL MD             D: 2018   T: 2018   MT: harry      Name:     KARENA BOWMAN   MRN:      [id number]        Account:      [id number]   :      1980           Service Date: 2018      Document: [id number]

## 2018-05-31 LAB
CERULOPLASMIN SERPL-MCNC: 21 MG/DL (ref 23–53)
LEVETIRACETAM SERPL-MCNC: 6 UG/ML (ref 12–46)

## 2018-06-01 LAB
A-TOCOPHEROL VIT E SERPL-MCNC: 8.8 MG/L (ref 5.5–18)
BETA+GAMMA TOCOPHEROL SERPL-MCNC: 1.1 MG/L (ref 0–6)

## 2018-06-02 LAB
SPECIMEN SOURCE: NORMAL
VIT B1 BLD-MCNC: 132 NMOL/L (ref 70–180)
WNV RNA SER QL NAA+PROBE: NOT DETECTED

## 2018-06-08 ENCOUNTER — TELEPHONE (OUTPATIENT)
Dept: NEUROLOGY | Facility: CLINIC | Age: 38
End: 2018-06-08

## 2018-06-08 NOTE — TELEPHONE ENCOUNTER
Spoke to pt and changed appointment from JUly 20 to July 6th. Dr. Velázquez asked that the appointment get moved up if possible.

## 2018-06-13 ENCOUNTER — TELEPHONE (OUTPATIENT)
Dept: NEUROLOGY | Facility: CLINIC | Age: 38
End: 2018-06-13

## 2018-06-13 NOTE — TELEPHONE ENCOUNTER
[facility] Call [facility]    Phone Message    May a detailed message be left on voicemail: yes    Reason for Call: Other: Pt calling asking if Dr. Velázquez would help him get a handicapped placard. Please call back to discuss steps on how to proceed.     Action Taken: Message routed to:  [facility]

## 2018-06-15 NOTE — TELEPHONE ENCOUNTER
Called patient, and confirmed address on where I can mail the Application for disability placard for his card. I told him to finish filling it out and bring it to the [employer]. Mailing it today [phone] on 6/15/2018

## 2018-07-02 NOTE — TELEPHONE ENCOUNTER
FUTURE VISIT INFORMATION      FUTURE VISIT INFORMATION:    Date: 07/06/2018    Time:     Location:   REFERRAL INFORMATION:    Referring provider:  Anthony Velázquez MD    Referring providers clinic:      Reason for visit/diagnosis        RECORDS STATUS      Internal Records: [facility], Care Everywhere and PACS.     Additional Records Located in the Scanned Images Icon located in Chart Review on both the Encounters and Media Tabs.

## 2018-07-06 ENCOUNTER — TELEPHONE (OUTPATIENT)
Dept: NEUROLOGY | Facility: CLINIC | Age: 38
End: 2018-07-06

## 2018-07-06 ENCOUNTER — OFFICE VISIT (OUTPATIENT)
Dept: NEUROLOGY | Facility: CLINIC | Age: 38
End: 2018-07-06
Payer: COMMERCIAL

## 2018-07-06 ENCOUNTER — CARE COORDINATION (OUTPATIENT)
Dept: NEUROLOGY | Facility: CLINIC | Age: 38
End: 2018-07-06

## 2018-07-06 ENCOUNTER — PRE VISIT (OUTPATIENT)
Dept: NEUROLOGY | Facility: CLINIC | Age: 38
End: 2018-07-06

## 2018-07-06 VITALS
HEART RATE: 94 BPM | HEIGHT: 72 IN | DIASTOLIC BLOOD PRESSURE: 88 MMHG | BODY MASS INDEX: 24.23 KG/M2 | TEMPERATURE: 98.9 F | OXYGEN SATURATION: 99 % | WEIGHT: 178.9 LBS | SYSTOLIC BLOOD PRESSURE: 137 MMHG | RESPIRATION RATE: 20 BRPM

## 2018-07-06 DIAGNOSIS — M62.838 MUSCLE SPASTICITY: ICD-10-CM

## 2018-07-06 DIAGNOSIS — R27.0 ATAXIA: ICD-10-CM

## 2018-07-06 DIAGNOSIS — R27.9 LACK OF COORDINATION: ICD-10-CM

## 2018-07-06 DIAGNOSIS — R27.0 ATAXIA: Primary | ICD-10-CM

## 2018-07-06 DIAGNOSIS — G40.909 SEIZURE DISORDER (H): ICD-10-CM

## 2018-07-06 DIAGNOSIS — M62.838 MUSCLE SPASTICITY: Primary | ICD-10-CM

## 2018-07-06 RX ORDER — LEVETIRACETAM 500 MG/1
1000 TABLET ORAL 2 TIMES DAILY
Qty: 360 TABLET | Refills: 4 | Status: SHIPPED | OUTPATIENT
Start: 2018-07-06 | End: 2018-08-28

## 2018-07-06 ASSESSMENT — ENCOUNTER SYMPTOMS
DEPRESSION: 0
MUSCLE WEAKNESS: 1
PANIC: 0
WEAKNESS: 1
MUSCLE CRAMPS: 1
SPEECH CHANGE: 0
SEIZURES: 0
JOINT SWELLING: 1
NUMBNESS: 0
INSOMNIA: 1
LOSS OF CONSCIOUSNESS: 0
MEMORY LOSS: 0
DECREASED CONCENTRATION: 1
HEADACHES: 0
DISTURBANCES IN COORDINATION: 1
BACK PAIN: 0
TINGLING: 0
ARTHRALGIAS: 1
TREMORS: 1
PARALYSIS: 0
NERVOUS/ANXIOUS: 1
MYALGIAS: 1
STIFFNESS: 1
NECK PAIN: 0
DIZZINESS: 1

## 2018-07-06 ASSESSMENT — PAIN SCALES - GENERAL: PAINLEVEL: NO PAIN (0)
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
## 2018-07-06 NOTE — MR AVS SNAPSHOT
After Visit Summary   7/6/2018    Main Greenfield    MRN: [id number]           Patient Information     Date Of Birth          1980        Visit Information        Provider Department      7/6/2018 11:30 AM Jacky Slaughter MD [facility]        Today's Diagnoses     Ataxia    -  1       Follow-ups after your visit        Your next 10 appointments already scheduled     Aug 28, 2018 11:00 AM CDT   (Arrive by 10:45 AM)   Return Visit with Anthony Velázquez MD   [facility] ([facility] [place])    [address] [place] [address] [phone]              Who to contact     Please call your clinic at [phone] to:    Ask questions about your health    Make or cancel appointments    Discuss your medicines    Learn about your test results    Speak to your doctor            Additional Information About Your Visit        Care EveryWhere ID     This is your Care EveryWhere ID. This could be used by other organizations to access your [place] medical records  [id number]        Your Vitals Were     Pulse Temperature Respirations Height Pulse Oximetry BMI (Body Mass Index)    94 98.9  F (37.2  C) (Oral) 20 1.829 m (6') 99% 24.26 kg/m2       Blood Pressure from Last 3 Encounters:   07/06/18 137/88   05/29/18 127/84   04/18/18 130/82    Weight from Last 3 Encounters:   07/06/18 81.1 kg (178 lb 14.4 oz)   05/29/18 81.8 kg (180 lb 4.8 oz)   04/18/18 83 kg (183 lb)              We Performed the Following     Ceruloplasmin     Glutamic acid decarboxylase antibody     Thyroglobulin and antibody     Thyroid peroxidase antibody          Today's Medication Changes          These changes are accurate as of 7/6/18 11:59 PM.  If you have any questions, ask your nurse or doctor.               These medicines have changed or have updated prescriptions.        Dose/Directions    levETIRAcetam 500 MG tablet   Commonly known as:  KEPPRA   This may have  changed:  how much to take   Used for:  Seizure disorder (H), Lack of coordination   Changed by:  Anthony Velázquez MD        Dose:  1000 mg   Take 2 tablets (1,000 mg) by mouth 2 times daily   Quantity:  360 tablet   Refills:  4            Where to get your medicines      These medications were sent to [facility] - [place], [place] - [address]  [address] [place] [address]     Phone:  [phone]     levETIRAcetam 500 MG tablet                Primary Care Provider Office Phone # Fax #    Torsten Lawrence [phone]       [address] [facility] [address]        Equal Access to Services     AG ROSS : Guicho graveso Soamrit, waaxda luqadaha, qaybta kaalmada bennieyalisy, jess ballard. So [facility] [phone].    ATENCIÓN: Si habla español, tiene a hopkins disposición servicios gratuitos de asistencia lingüística. [facility] [phone].    We comply with applicable federal civil rights laws and [place] laws. We do not discriminate on the basis of race, color, national origin, age, disability, sex, sexual orientation, or gender identity.            Thank you!     Thank you for choosing [facility] NEUROLOGY  for your care. Our goal is always to provide you with excellent care. Hearing back from our patients is one way we can continue to improve our services. Please take a few minutes to complete the written survey that you may receive in the mail after your visit with us. Thank you!             Your Updated Medication List - Protect others around you: Learn how to safely use, store and throw away your medicines at www.disposemymeds.org.          This list is accurate as of 7/6/18 11:59 PM.  Always use your most recent med list.                   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

## 2018-07-06 NOTE — TELEPHONE ENCOUNTER
Called to tell patient that the medicationlevETIRAcetam (KEPPRA) was changed to two 500 mg bid. So 4 pills daily. Dr. Velázquez recommends this and it is sent to his pharmacy for him to .

## 2018-07-06 NOTE — PROGRESS NOTES
Service Date: 2018      SUBJECTIVE:  Karena Greenfield is a 38-year-old man who was referred to the A[facility]ia Clinic by Dr. Velázquez for evaluation of gait ataxia.  He sees Dr. Velázquez for seizure disorder.  He had a seizure in , which was apparently unprovoked.  He had 2 more seizures and is currently well controlled on Keppra.  There was a question of alcohol abuse.  However, according to Mr. Greenfield and his mother the seizure was not provoked by alcohol or alcohol withdrawal and he has not been a heavy drinker.  In his records; however, there was a positive alcohol in  at around 9:50 p.m.  That situation was not clear from the records.      PHYSICAL EXAMINATION:  Mr. Greenfield was a cognitively intact, pleasant gentleman, no dysarthria.  Cranial nerves were unremarkable.  No nystagmus or diplopia.  Extraocular movements were full.  No palatal tremor.  Facial movements and sensation were intact.  Upper and lower extremities showed diffuse hyperreflexia and lower extremity spasticity and some dysmetria of the upper extremities.  His gait was wide-based and ataxic.  Plantar responses were equivocal bilaterally.  Sensory examination was unremarkable.  The MRI of the brain does show cerebellar atrophy, more so in the vermis region.  I did discuss with Mr. Greenfield that we will check for treatable causes of ataxia, including PIA antibodies.  However, he will need to have next-generation sequencing to rule out SPG type of picture.  He was already checked for B1, B12 and vitamin E which were all negative.  He was found to have slightly low ceruloplasmin and I will repeat that today.  I will see him back in the clinic as planned.         QUE HARRIS MD             D: 2018   T: 2018   MT: salvador      Name:     KARENA GREENFIELD   MRN:      [id number]        Account:      [id number]   :      1980           Service Date: 2018      Document: [id number]

## 2018-07-06 NOTE — LETTER
7/6/2018       RE: Mian Greenfield  [address] [address]     Dear Colleague,    Thank you for referring your patient, Mian Greenfield, to the [facility] NEUROLOG[facility] at [facility]. Please see a copy of my visit note below.      Service Date: 07/06/2018      SUBJECTIVE:  Mian Greenfield is a 38-year-old man who was referred to the Ataxia Clinic by Dr. Velázquez for evaluation of gait ataxia.  He sees Dr. Velázquez for seizure disorder.  He had a seizure in 2015, which was apparently unprovoked.  He had 2 more seizures and is currently well controlled on Keppra.  There was a question of alcohol abuse.  However, according to Mr. Greenfield and his mother the seizure was not provoked by alcohol or alcohol withdrawal and he has not been a heavy drinker.  In his records; however, there was a positive alcohol in 2013 at around 9:50 p.m.  That situation was not clear from the records.      PHYSICAL EXAMINATION:  Mr. Greenfield was a cognitively intact, pleasant gentleman, no dysarthria.  Cranial nerves were unremarkable.  No nystagmus or diplopia.  Extraocular movements were full.  No palatal tremor.  Facial movements and sensation were intact.  Upper and lower extremities showed diffuse hyperreflexia and lower extremity spasticity and some dysmetria of the upper extremities.  His gait was wide-based and ataxic.  Plantar responses were equivocal bilaterally.  Sensory examination was unremarkable.  The MRI of the brain does show cerebellar atrophy, more so in the vermis region.  I did discuss with Mr. Greenfield that we will check for treatable causes of ataxia, including PIA antibodies.  However, he will need to have next-generation sequencing to rule out SPG type of picture.  He was already checked for B1, B12 and vitamin E which were all negative.  He was found to have slightly low ceruloplasmin and I will repeat that today.  I will see him back in the clinic as planned.         D: 07/06/2018   T:  2018   MT: tb      Name:     KARENA BOWMAN   MRN:      [id number]        Account:      [id number]   :      1980           Service Date: 2018      Document: [id number]         Again, thank you for allowing me to participate in the care of your patient.      Sincerely,    Jacky Slaughter MD

## 2018-07-06 NOTE — NURSING NOTE
Chief Complaint   Patient presents with     Consult     [facility] NEW - CONSULT ATAXIA     Rosemary Sosa MA

## 2018-07-06 NOTE — MR AVS SNAPSHOT
After Visit Summary   7/6/2018    Mian Greenfield    MRN: [id number]           Patient Information     Date Of Birth          1980        Visit Information        Provider Department      7/6/2018 11:30 AM Ben Austin GC [facility]rology        Today's Diagnoses     Muscle spasticity    -  1    Ataxia           Follow-ups after your visit        Your next 10 appointments already scheduled     Aug 28, 2018 11:00 AM CDT   (Arrive by 10:45 AM)   Return Visit with Anthony Velázquez MD   [facility] ([facility] [place])    [address] [address]   [phone]              Who to contact     Please call your clinic at [phone] to:    Ask questions about your health    Make or cancel appointments    Discuss your medicines    Learn about your test results    Speak to your doctor            Additional Information About Your Visit        Care EveryWhere ID     This is your Care EveryWhere ID. This could be used by other organizations to access your [place] medical records  [id number]         Blood Pressure from Last 3 Encounters:   07/06/18 137/88   05/29/18 127/84   04/18/18 130/82    Weight from Last 3 Encounters:   07/06/18 81.1 kg (178 lb 14.4 oz)   05/29/18 81.8 kg (180 lb 4.8 oz)   04/18/18 83 kg (183 lb)               Primary Care Provider Office Phone # Fax #    Torsten Lawrence [phone] [address] [facility] [address]        Equal Access to Services     AG ROSS AH: Hadii oriana graveso Soamrit, waaxda luqadaha, qaybta kaalmada bennieyada, jess ballard. So [facility] [phone].    ATENCIÓN: Si habla español, tiene a hopkins disposición servicios gratuitos de asistencia lingüística. Llame al [phone].    We comply with applicable federal civil rights laws and [place] laws. We do not discriminate on the basis of race, color, national origin, age, disability, sex,  sexual orientation, or gender identity.            Thank you!     Thank you for choosing [facility] NEUROLOGY  for your care. Our goal is always to provide you with excellent care. Hearing back from our patients is one way we can continue to improve our services. Please take a few minutes to complete the written survey that you may receive in the mail after your visit with us. Thank you!             Your Updated Medication List - Protect others around you: Learn how to safely use, store and throw away your medicines at www.disposemymeds.org.          This list is accurate as of 7/6/18  1:10 PM.  Always use your most recent med list.                   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

## 2018-07-06 NOTE — PROGRESS NOTES
Answers for HPI/ROS submitted by the patient on 7/6/2018   General Symptoms: No  Skin Symptoms: No  HENT Symptoms: No  EYE SYMPTOMS: No  HEART SYMPTOMS: No  LUNG SYMPTOMS: No  INTESTINAL SYMPTOMS: No  URINARY SYMPTOMS: No  REPRODUCTIVE SYMPTOMS: No  SKELETAL SYMPTOMS: Yes  BLOOD SYMPTOMS: No  NERVOUS SYSTEM SYMPTOMS: Yes  MENTAL HEALTH SYMPTOMS: Yes  Back pain: No  Muscle aches: Yes  Neck pain: No  Swollen joints: Yes  Joint pain: Yes  Bone pain: Yes  Muscle cramps: Yes  Muscle weakness: Yes  Joint stiffness: Yes  Bone fracture: No  Trouble with coordination: Yes  Dizziness or trouble with balance: Yes  Fainting or black-out spells: No  Memory loss: No  Headache: No  Seizures: No  Speech problems: No  Tingling: No  Tremor: Yes  Weakness: Yes  Difficulty walking: Yes  Paralysis: No  Numbness: No  Nervous or Anxious: Yes  Depression: No  Trouble sleeping: Yes  Trouble thinking or concentrating: Yes  Mood changes: No  Panic attacks: No

## 2018-07-06 NOTE — PROGRESS NOTES
Faxed the dmv paperwork [phone] today at 220pm on 7/06/2018, and mailed to patient as well, and sent a copy of it to [employer].

## 2018-07-06 NOTE — TELEPHONE ENCOUNTER
called and left a message for patient to NOT drive until he is INFORMED he can, he needs his blood levels checked in 1 week. This is what Dr. Velázquez told me.

## 2018-07-07 LAB — GAD65 AB SER IA-ACNC: <5 IU/ML (ref 0–5)

## 2018-07-09 LAB
CERULOPLASMIN SERPL-MCNC: 25 MG/DL (ref 23–53)
COPATH REPORT: NORMAL
THYROPEROXIDASE AB SERPL-ACNC: <10 IU/ML

## 2018-07-10 ENCOUNTER — TELEPHONE (OUTPATIENT)
Dept: NEUROLOGY | Facility: CLINIC | Age: 38
End: 2018-07-10

## 2018-07-10 NOTE — TELEPHONE ENCOUNTER
Health Call Center    Phone Message    May a detailed message be left on voicemail: yes    Reason for Call: Other: Patient states he received a voicemail from 's nurse and wants to follow up with the nurses. Please call patient back      Action Taken: Message routed to:  [facility] Neurology

## 2018-07-16 ENCOUNTER — TELEPHONE (OUTPATIENT)
Dept: NEUROLOGY | Facility: CLINIC | Age: 38
End: 2018-07-16

## 2018-07-16 LAB — LAB SCANNED RESULT: NORMAL

## 2018-08-09 ENCOUNTER — TELEPHONE (OUTPATIENT)
Dept: NEUROLOGY | Facility: CLINIC | Age: 38
End: 2018-08-09

## 2018-08-10 ENCOUNTER — TELEPHONE (OUTPATIENT)
Dept: NEUROLOGY | Facility: CLINIC | Age: 38
End: 2018-08-10

## 2018-08-10 NOTE — TELEPHONE ENCOUNTER
GENETIC COUNSELING-Neurology  I left a second message for Mian indicating that insurance approved genetic testing for HSP and would like him to call me back to let me know whether he wants to proceed with testing.    Alejo Austin MS, [facility]  Licensed Genetic Counselor

## 2018-08-28 ENCOUNTER — OFFICE VISIT (OUTPATIENT)
Dept: NEUROLOGY | Facility: CLINIC | Age: 38
End: 2018-08-28
Payer: COMMERCIAL

## 2018-08-28 ENCOUNTER — HOSPITAL ENCOUNTER (OUTPATIENT)
Facility: CLINIC | Age: 38
Setting detail: SPECIMEN
Discharge: HOME | End: 2018-08-28
Admitting: PSYCHIATRY & NEUROLOGY
Payer: COMMERCIAL

## 2018-08-28 VITALS
SYSTOLIC BLOOD PRESSURE: 149 MMHG | DIASTOLIC BLOOD PRESSURE: 98 MMHG | RESPIRATION RATE: 18 BRPM | TEMPERATURE: 98.7 F | HEIGHT: 72 IN | BODY MASS INDEX: 25.68 KG/M2 | HEART RATE: 89 BPM | WEIGHT: 189.6 LBS | OXYGEN SATURATION: 100 %

## 2018-08-28 DIAGNOSIS — R56.9 SEIZURES (H): ICD-10-CM

## 2018-08-28 DIAGNOSIS — R27.9 LACK OF COORDINATION: ICD-10-CM

## 2018-08-28 DIAGNOSIS — G11.4 HEREDITARY SPASTIC PARAPLEGIA (H): Primary | ICD-10-CM

## 2018-08-28 DIAGNOSIS — G40.909 SEIZURE DISORDER (H): ICD-10-CM

## 2018-08-28 DIAGNOSIS — G11.4 HEREDITARY SPASTIC PARAPLEGIA (H): ICD-10-CM

## 2018-08-28 RX ORDER — LEVETIRACETAM 500 MG/1
1000 TABLET ORAL 2 TIMES DAILY
Qty: 360 TABLET | Refills: 11 | Status: SHIPPED | OUTPATIENT
Start: 2018-08-28 | End: 2019-08-22

## 2018-08-28 ASSESSMENT — PAIN SCALES - GENERAL: PAINLEVEL: NO PAIN (0)

## 2018-08-28 NOTE — LETTER
2018       RE: Mian Greenfield  [address] [facility] [address]     Dear Colleague,    Thank you for referring your patient, Mian Greenfield, to the [facility] NEUROLOGY at [facility]. Please see a copy of my visit note below.    Service Date: 2018             Torsten Lawrence MD   [place] [id number]      RE:    Mian Greenfield   MRN:  [id number]   :  1980      Dear Dr. Lawrence:        Mr. Greenfield is a 38-year-old who was diagnosed with ataxia, mostly with spasticity and hyperreflexia and a diagnosis of a spastic paraplegia syndrome has been given by Dr. Slaughter's group.  They have ordered a panel of hereditary spastic paraplegia, genetic testing and this has been authorized by the insurance, so we went ahead today and ordered the panel.  He signed the form and agreed to it and we went through it in detail.  Results will be interpreted by them.        Overall, he says the ataxia and walking difficulty may be slightly better and the mother seemed to think so.  He has been on levetiracetam for a history of seizures and this was in a dose of 1000 mg twice a day.  Last level was subtherapeutic at 6.  Will recheck a level today.  He has had no seizure except those 2.  There has been some alcohol involved.      He has had completed the workup for the ataxia with negative results.  The ceruloplasmin was in the low end, but that is within normal limits.  He has been taking the vitamins okay.      He is not working and is applying for disability, was denied and will try again.        He does drive.  We have asked the [facility] whether there needs to be a 's test with his ataxia or is he capable of managing emergencies on the road.      His exam today shows blood pressure 149/98, pulse is 89.  His gait is moderately ataxic and cannot do tandem.  Finger-to-nose testing is ataxic on the left more than the  right.  Reflexes are very brisk throughout.  He has a few beats of clonus, I believe.  Sensory exam has been normal before.  Eye movements are good with good pursuit.      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  He has completed workup.  He has seizures and this has been doing well.  His last EEG was normal, but that was 4 years ago, so we will repeat one here for routine followup.  We will check a concentration of Keppra and will last the [facility] whether his driving skills need to be assessed.  He also needs some rehab and he will be referred to Occupational Therapy one diagnosis is established.  He is interested to know about prognosis for his kids and we will have Dr. Alejo Austin handle that.        We will see him p.r.n. for the seizures that should be maintained with levetiracetam.  We will notify him with EEG.         D: 2018   T: 2018   MT: MOON      Name:     KARENA BOWMAN   MRN:      [id number]        Account:      [id number]   :      1980           Service Date: 2018      Document: [id number]       Again, thank you for allowing me to participate in the care of your patient.      Sincerely,    Anthony Velázquez MD

## 2018-08-28 NOTE — PROGRESS NOTES
Service Date: 2018             [place] [address]      RE:    Mian Greenfield   MRN:  [id number]   :  1980      Dear Dr. Lawrence:        Mr. Greenfield is a 38-year-old who was diagnosed with ataxia, mostly with spasticity and hyperreflexia and a diagnosis of a spastic paraplegia syndrome has been given by Dr. Slaughter's group.  They have ordered a panel of hereditary spastic paraplegia, genetic testing and this has been authorized by the insurance, so we went ahead today and ordered the panel.  He signed the form and agreed to it and we went through it in detail.  Results will be interpreted by them.        Overall, he says the ataxia and walking difficulty may be slightly better and the mother seemed to think so.  He has been on levetiracetam for a history of seizures and this was in a dose of 1000 mg twice a day.  Last level was subtherapeutic at 6.  Will recheck a level today.  He has had no seizure except those 2.  There has been some alcohol involved.      He has had completed the workup for the ataxia with negative results.  The ceruloplasmin was in the low end, but that is within normal limits.  He has been taking the vitamins okay.      He is not working and is applying for disability, was denied and will try again.        He does drive.  We have asked the [facility] whether there needs to be a 's test with his ataxia or is he capable of managing emergencies on the road.      His exam today shows blood pressure 149/98, pulse is 89.  His gait is moderately ataxic and cannot do tandem.  Finger-to-nose testing is ataxic on the left more than the right.  Reflexes are very brisk throughout.  He has a few beats of clonus, I believe.  Sensory exam has been normal before.  Eye movements are good with good pursuit.      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  He has completed  workup.  He has seizures and this has been doing well.  His last EEG was normal, but that was 4 years ago, so we will repeat one here for routine followup.  We will check a concentration of Keppra and will last the [facility] whether his driving skills need to be assessed.  He also needs some rehab and he will be referred to Occupational Therapy one diagnosis is established.  He is interested to know about prognosis for his kids and we will have Dr. Alejo Austin handle that.        We will see him p.r.n. for the seizures that should be maintained with levetiracetam.  We will notify him with EEG.      Sincerely,         MD JOSÉ MANUEL Arellano MD             D: 2018   T: 2018   MT: MOON      Name:     KARENA BOWMAN   MRN:      [id number]        Account:      [id number]   :      1980           Service Date: 2018      Document: [id number]

## 2018-08-28 NOTE — MR AVS SNAPSHOT
After Visit Summary   8/28/2018    Mian Greenfield    MRN: [id number]           Patient Information     Date Of Birth          1980        Visit Information        Provider Department      8/28/2018 11:00 AM Anthony Velázquez MD [facility] Neurology        Today's Diagnoses     Hereditary spastic paraplegia (H)    -  1    Seizures (H)        Seizure disorder (H)        Lack of coordination           Follow-ups after your visit        Follow-up notes from your care team     Return if symptoms worsen or fail to improve.      Your next 10 appointments already scheduled     Aug 28, 2018 11:45 AM CDT   LAB with [facility] ([facility])    [address] [place] [id number] [phone]           Please do not eat 10-12 hours before your appointment if you are coming in fasting for labs on lipids, cholesterol, or glucose (sugar). This does not apply to pregnant women. Water, hot tea and black coffee (with nothing added) are okay. Do not drink other fluids, diet soda or chew gum.              Future tests that were ordered for you today     Open Future Orders        Priority Expected Expires Ordered    EEG ROUTINE ([id number]) Routine  9/25/2018 8/28/2018    Next Generation Sequencing Routine  8/28/2019 8/28/2018            Who to contact     Please call your clinic at [phone] to:    Ask questions about your health    Make or cancel appointments    Discuss your medicines    Learn about your test results    Speak to your doctor            Additional Information About Your Visit        MyChart Information     [employer] is an electronic gateway that provides easy, online access to your medical records. With [email or web address], you can request a clinic appointment, read your test results, renew a prescription or communicate with your care team.     To sign up for [employer] visit the website at [email or web address]   You will be asked to enter the access code  listed below, as well as some personal information. Please follow the directions to create your username and password.     Your access code is: [email or web address]  Expires: 2018 11:42 AM     Your access code will  in 90 days. If you need help or a new code, please contact your [facility] or call [phone] for assistance.        Care EveryWhere ID     This is your Care EveryWhere ID. This could be used by other organizations to access your [place] medical records  [id number]        Your Vitals Were     Pulse Temperature Respirations Height Pulse Oximetry BMI (Body Mass Index)    89 98.7  F (37.1  C) (Oral) 18 1.829 m (6') 100% 25.71 kg/m2       Blood Pressure from Last 3 Encounters:   18 (!) 149/98   18 137/88   18 127/84    Weight from Last 3 Encounters:   18 86 kg (189 lb 9.6 oz)   18 81.1 kg (178 lb 14.4 oz)   18 81.8 kg (180 lb 4.8 oz)              We Performed the Following     Keppra (Levetiracetam) Level          Where to get your medicines      [place] [facility] [place] [address] [address]     Phone:  [phone]     levETIRAcetam 500 MG tablet          Primary Care Provider Office Phone # Fax #    Torsten Lawrence [phone]       [address] [place] [address]        Equal Access to Services     AG ROSS AH: Haddaniel Perea, piper walker, qaybalfredo zamudioaljess oates. So [facility] [phone].    ATENCIÓN: Si habla español, tiene a hopkins disposición servicios gratuitos de asistencia lingüística. Llame al [phone].    We comply with applicable federal civil rights laws and [place] laws. We do not discriminate on the basis of race, color, national origin, age, disability, sex, sexual orientation, or gender identity.            Thank you!     Thank you for  choosing [facility] NEUROLOGY  for your care. Our goal is always to provide you with excellent care. Hearing back from our patients is one way we can continue to improve our services. Please take a few minutes to complete the written survey that you may receive in the mail after your visit with us. Thank you!             Your Updated Medication List - Protect others around you: Learn how to safely use, store and throw away your medicines at www.disposemymeds.org.          This list is accurate as of 8/28/18 11:42 AM.  Always use your most recent med list.                   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

## 2018-08-28 NOTE — NURSING NOTE
Chief Complaint   Patient presents with     RECHECK     [facility] RETURN F/U ABNORMAL MOVEMENTS AFFECTING BILATERAL LOWER EXTREMETIES     Madhu Ho, EMT

## 2018-08-29 LAB — LEVETIRACETAM SERPL-MCNC: 26 UG/ML (ref 12–46)

## 2018-10-08 LAB — COPATH REPORT: NORMAL

## 2018-10-09 NOTE — PROGRESS NOTES
GENETIC COUNSELING-Neurology  I left a second message for Mian with NORMAL genetic testing results. I asked him again to call me back to confirm that he received these results.  I have also sent a copy of his results to him in the mail.    Alejo Austin MS, [facility]  Licensed Genetic Counselor

## 2018-10-12 ENCOUNTER — DOCUMENTATION ONLY (OUTPATIENT)
Dept: NEUROLOGY | Facility: CLINIC | Age: 38
End: 2018-10-12

## 2018-10-12 NOTE — TELEPHONE ENCOUNTER
GENETIC COUNSELING-Neurology clinic    Mian- I am sending you a copy of your recent genetic testing results for hereditary spastic paraplegia. I tried to reach you by telephone but have not heard back from you.  The testing is NORMAL- meaning that we did not identify a clear genetic cause for your symptoms.  This testing excludes most of the known genetic causes of hereditary spastic paraplegia.  Please do not hesitate to contact me with any questions about this testing.    Alejo Austin MS, [facility]  Licensed Genetic Counselor  [phone]

## 2019-07-19 ENCOUNTER — TELEPHONE (OUTPATIENT)
Dept: NEUROLOGY | Facility: CLINIC | Age: 39
End: 2019-07-19

## 2019-07-19 NOTE — TELEPHONE ENCOUNTER
DMV Form received from patient via fax.  DMV Form Completed and placed in MD folder for signature.

## 2019-07-19 NOTE — TELEPHONE ENCOUNTER
[facility] Call [facility]    Phone Message    May a detailed message be left on voicemail: yes    Reason for Call: Form or Letter   Type or form/letter needing completion: Loss of consciousness or voluntary control  Provider: Dr. Velázquez  Date form needed: ASAP Form was due on 7.11.19, license will be revoked on 7.23.19  Once completed: Fax [phone]      Mian will be faxing form to clinic today, however he would like a call back to discuss form.  He states that he has not had a seizure in over two years and would like to know how often this form with have to be completed.      Action Taken: Message routed to:  [facility] neurology      Nurse returned call to patient and pointed out to him that the LOC form indicates a 6 mo or 1 year review is required until seizure free for 4 years.    Patient expressed surprise, but understanding.

## 2019-08-22 ENCOUNTER — OFFICE VISIT (OUTPATIENT)
Dept: NEUROLOGY | Facility: CLINIC | Age: 39
End: 2019-08-22
Payer: COMMERCIAL

## 2019-08-22 VITALS
OXYGEN SATURATION: 99 % | BODY MASS INDEX: 25.87 KG/M2 | DIASTOLIC BLOOD PRESSURE: 93 MMHG | RESPIRATION RATE: 16 BRPM | HEART RATE: 114 BPM | WEIGHT: 191 LBS | HEIGHT: 72 IN | SYSTOLIC BLOOD PRESSURE: 139 MMHG

## 2019-08-22 DIAGNOSIS — R27.0 ATAXIA: ICD-10-CM

## 2019-08-22 DIAGNOSIS — G11.4 HEREDITARY SPASTIC PARAPLEGIA (H): ICD-10-CM

## 2019-08-22 DIAGNOSIS — R56.9 SEIZURES (H): ICD-10-CM

## 2019-08-22 DIAGNOSIS — R27.9 LACK OF COORDINATION: ICD-10-CM

## 2019-08-22 DIAGNOSIS — G40.909 SEIZURE DISORDER (H): ICD-10-CM

## 2019-08-22 DIAGNOSIS — R27.0 ATAXIA: Primary | ICD-10-CM

## 2019-08-22 LAB — LACTATE BLD-SCNC: 3 MMOL/L (ref 0.7–2)

## 2019-08-22 RX ORDER — LEVETIRACETAM 500 MG/1
1000 TABLET ORAL 2 TIMES DAILY
Qty: 360 TABLET | Refills: 11 | Status: SHIPPED | OUTPATIENT
Start: 2019-08-22 | End: 2019-11-21

## 2019-08-22 ASSESSMENT — PAIN SCALES - GENERAL: PAINLEVEL: NO PAIN (0)

## 2019-08-22 ASSESSMENT — PATIENT HEALTH QUESTIONNAIRE - PHQ9: SUM OF ALL RESPONSES TO PHQ QUESTIONS 1-9: 0

## 2019-08-22 ASSESSMENT — MIFFLIN-ST. JEOR: SCORE: 1829.37

## 2019-08-22 NOTE — NURSING NOTE
Chief Complaint   Patient presents with     Seizures     [facility] RETURN F/U SEIZURES       Madhu Ho, EMT

## 2019-08-22 NOTE — PROGRESS NOTES
Service Date: 2019             [place] [address]      RE:    Mian Greenfield   MRN:  [id number]   :  1980      Dear Dr. Lawrence:      Mr. Greenfield is a 39-year-old male with ataxia.  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  My assessment had indicated diffuse hyperreflexia and lower extremity spasticity, but there were cerebellar findings with a wide-based gait and ataxic syndrome as well with negative sensory examination with the MRI showing cerebellar atrophy, more so in the vermis region.  He underwent laboratory testing, but he did not have any insurance, so this was limited.  His vitamin levels, including vitamin E were normal.  He did have a battery for hereditary spastic paraplegia and the testing was normal.  He has no family history of these.        He has had seizures also that developed pain in association with his ataxic syndrome.  These have been well controlled.  He has been seizure-free for 4 years now.        He comes back today for a visit regarding his 's license and followup now he has insurance.  We reviewed his lab data and he did have a borderline ceruloplasmin, so we ordered a serum copper.  He has not had the ataxia protocol of genetic testing as per Dr. Slaughter's recommendation.  He has had no seizures in a while.  His EEG has shown significant slowing.  He has been on levetiracetam with a dose of 1000 twice a day.  His last blood test of the levetiracetam was 2018 when was the last time and that was therapeutic at 26.      He reports that his walking difficulty is not any worse.  He has not been able to have any physical therapy because of lack of insurance.  We have ordered that to be done in [place].      He needs a repeat  MRI to compare and see if there is any progression in the cerebellar atrophy.  We have ordered a copper level, Keppra level and will keep him on the same medications.      We reexamined him today.  His blood pressure is 139/93.  Weight is 191.  He is moderately unsteady on his feet, cannot do tandem or heel-to-shin.  Arms are ataxic, especially on the left, so is the left foot.  His speech is dysarthric with perhaps scanning of speech.  Eye movements are not impaired.      He has had a cervical MRI, which is normal.  Neck is not painful.  No Babinski was obtained today.  His reflexes are very brisk.  There is no clonus.  There is no sensory deficit.      In summary, ataxia with spasticity and upper motor neuron signs, etiology not clear, negative family history and screening for hereditary spastic paraplegia negative.  The ataxia/cerebellar battery has not been done.  He does have scoliosis.  Seizures are present and appear generalized without warning.  These are well controlled on the levetiracetam.      We will proceed to let him drive and signed the permit for 2 years.  We will check his concentration today and add some other laboratory studies as described.      He will have physical therapy in the [place] area as it is 3-1/2 hours away from here.  We will check with Genetics and see if he needs ataxia testing.      Sincerely,         MD JOSÉ MANUEL Arellano MD             D: 2019   T: 2019   MT: MOON      Name:     KARENA BOWMAN   MRN:      [id number]        Account:      [id number]   :      1980           Service Date: 2019      Document: [id number]

## 2019-08-22 NOTE — LETTER
RE: Mian Greenfield  [address] [place] [address]       Service Date: 2019      Torsten Lawrence MD    [place] [id number]      RE:    Mian Greenfield   MRN:  [id number]   :  1980      Dear Dr. Lawrence:      Mr. rGeenfield is a 39-year-old male with ataxia.  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  My assessment had indicated diffuse hyperreflexia and lower extremity spasticity, but there were cerebellar findings with a wide-based gait and ataxic syndrome as well with negative sensory examination with the MRI showing cerebellar atrophy, more so in the vermis region.  He underwent laboratory testing, but he did not have any insurance, so this was limited.  His vitamin levels, including vitamin E were normal.  He did have a battery for hereditary spastic paraplegia and the testing was normal.  He has no family history of these.        He has had seizures also that developed pain in association with his ataxic syndrome.  These have been well controlled.  He has been seizure-free for 4 years now.        He comes back today for a visit regarding his 's license and followup now he has insurance.  We reviewed his lab data and he did have a borderline ceruloplasmin, so we ordered a serum copper.  He has not had the ataxia protocol of genetic testing as per Dr. Slaughter's recommendation.  He has had no seizures in a while.  His EEG has shown significant slowing.  He has been on levetiracetam with a dose of 1000 twice a day.  His last blood test of the levetiracetam was 2018 when was the last time and that was therapeutic at 26.      He reports that his walking difficulty is not any worse.  He has not been able to have any physical therapy because of lack of insurance.  We have ordered that to be done  in [place].      He needs a repeat MRI to compare and see if there is any progression in the cerebellar atrophy.  We have ordered a copper level, Keppra level and will keep him on the same medications.      We reexamined him today.  His blood pressure is 139/93.  Weight is 191.  He is moderately unsteady on his feet, cannot do tandem or heel-to-shin.  Arms are ataxic, especially on the left, so is the left foot.  His speech is dysarthric with perhaps scanning of speech.  Eye movements are not impaired.      He has had a cervical MRI, which is normal.  Neck is not painful.  No Babinski was obtained today.  His reflexes are very brisk.  There is no clonus.  There is no sensory deficit.      In summary, ataxia with spasticity and upper motor neuron signs, etiology not clear, negative family history and screening for hereditary spastic paraplegia negative.  The ataxia/cerebellar battery has not been done.  He does have scoliosis.  Seizures are present and appear generalized without warning.  These are well controlled on the levetiracetam.      We will proceed to let him drive and signed the permit for 2 years.  We will check his concentration today and add some other laboratory studies as described.      He will have physical therapy in the [place] area as it is 3-1/2 hours away from here.  We will check with Genetics and see if he needs ataxia testing.      Sincerely,      Anthony Velázquez MD

## 2019-08-22 NOTE — Clinical Note
8/22/2019       RE: Mian Greenfield  [address] [place] [address]     Dear Colleague,    Thank you for referring your patient, Mian Greenfield, to the [facility] NEUROLOGY at [facility]. Please see a copy of my visit note below.    No notes on file    Again, thank you for allowing me to participate in the care of your patient.      Sincerely,    Anthony Velázquez MD

## 2019-08-23 LAB — LEVETIRACETAM SERPL-MCNC: 19 UG/ML (ref 12–46)

## 2019-08-24 LAB
COPPER SERPL-MCNC: 85.3 UG/DL (ref 70–140)
GAD65 AB SER IA-ACNC: <5 IU/ML (ref 0–5)

## 2019-08-26 ENCOUNTER — HOSPITAL ENCOUNTER (OUTPATIENT)
Dept: MRI IMAGING | Facility: HOSPITAL | Age: 39
Discharge: HOME | End: 2019-08-26
Attending: PSYCHIATRY & NEUROLOGY | Admitting: PSYCHIATRY & NEUROLOGY
Payer: COMMERCIAL

## 2019-08-26 DIAGNOSIS — R27.0 ATAXIA: ICD-10-CM

## 2019-08-26 PROCEDURE — 70553 MRI BRAIN STEM W/O & W/DYE: CPT | Mod: TC

## 2019-08-26 PROCEDURE — A9585 GADOBUTROL INJECTION: HCPCS | Performed by: RADIOLOGY

## 2019-08-26 PROCEDURE — 25500064 ZZH RX 255 OP 636: Performed by: RADIOLOGY

## 2019-08-26 RX ORDER — GADOBUTROL 604.72 MG/ML
2 INJECTION INTRAVENOUS ONCE
Status: COMPLETED | OUTPATIENT
Start: 2019-08-26 | End: 2019-08-26

## 2019-08-26 RX ORDER — GADOBUTROL 604.72 MG/ML
7.5 INJECTION INTRAVENOUS ONCE
Status: COMPLETED | OUTPATIENT
Start: 2019-08-26 | End: 2019-08-26

## 2019-08-26 RX ADMIN — GADOBUTROL 2 ML: 604.72 INJECTION INTRAVENOUS at 14:48

## 2019-08-26 RX ADMIN — GADOBUTROL 7.5 ML: 604.72 INJECTION INTRAVENOUS at 14:48

## 2019-09-19 ENCOUNTER — DOCUMENTATION ONLY (OUTPATIENT)
Dept: NEUROLOGY | Facility: CLINIC | Age: 39
End: 2019-09-19

## 2019-09-19 NOTE — PROGRESS NOTES
Medical Opinion Form received Form was not complete (date of signature and last date of service along with follow up appointment was needed on form I refaxed completed forms to    [phone]. Forms have been scanned to chart

## 2019-11-21 ENCOUNTER — APPOINTMENT (OUTPATIENT)
Dept: LAB | Facility: CLINIC | Age: 39
End: 2019-11-21
Payer: COMMERCIAL

## 2019-11-21 ENCOUNTER — OFFICE VISIT (OUTPATIENT)
Dept: NEUROLOGY | Facility: CLINIC | Age: 39
End: 2019-11-21
Payer: COMMERCIAL

## 2019-11-21 VITALS
BODY MASS INDEX: 25.97 KG/M2 | WEIGHT: 191.5 LBS | HEART RATE: 95 BPM | OXYGEN SATURATION: 98 % | SYSTOLIC BLOOD PRESSURE: 142 MMHG | DIASTOLIC BLOOD PRESSURE: 89 MMHG

## 2019-11-21 DIAGNOSIS — G11.4 HEREDITARY SPASTIC PARAPLEGIA (H): ICD-10-CM

## 2019-11-21 DIAGNOSIS — G40.909 SEIZURE DISORDER (H): ICD-10-CM

## 2019-11-21 DIAGNOSIS — R27.9 LACK OF COORDINATION: ICD-10-CM

## 2019-11-21 DIAGNOSIS — R56.9 SEIZURES (H): ICD-10-CM

## 2019-11-21 RX ORDER — LEVETIRACETAM 500 MG/1
1000 TABLET ORAL 2 TIMES DAILY
Qty: 360 TABLET | Refills: 11 | Status: SHIPPED | OUTPATIENT
Start: 2019-11-21 | End: 2020-07-06

## 2019-11-21 RX ORDER — BACLOFEN 5 MG/1
5 TABLET ORAL 3 TIMES DAILY
Qty: 90 TABLET | Refills: 11 | Status: SHIPPED | OUTPATIENT
Start: 2019-11-21 | End: 2021-04-30

## 2019-11-21 ASSESSMENT — ENCOUNTER SYMPTOMS
PARALYSIS: 0
TINGLING: 0
WEAKNESS: 1
SPEECH CHANGE: 0
JOINT SWELLING: 1
MUSCLE WEAKNESS: 1
SEIZURES: 0
NECK PAIN: 0
MEMORY LOSS: 0
LOSS OF CONSCIOUSNESS: 0
BACK PAIN: 0
STIFFNESS: 0
NUMBNESS: 0
DIZZINESS: 1
MYALGIAS: 1
TREMORS: 1
DISTURBANCES IN COORDINATION: 1
ARTHRALGIAS: 1
MUSCLE CRAMPS: 1
HEADACHES: 0

## 2019-11-21 ASSESSMENT — PAIN SCALES - GENERAL: PAINLEVEL: MILD PAIN (3)

## 2019-11-21 NOTE — NURSING NOTE
Chief Complaint   Patient presents with     RECHECK     [facility] RETURN 3 MONTH FOLLOW UP       Allison Ridley, EMT

## 2019-11-21 NOTE — LETTER
11/21/2019       RE: Mian Greenfield  [address] [address]     Dear Colleague,    Thank you for referring your patient, Mian Greenfield, to the [facility] NEUROLOGY at [facility]. Please see a copy of my visit note below.    Answers for HPI/ROS submitted by the patient on 11/21/2019   General Symptoms: No  Skin Symptoms: No  HENT Symptoms: No  EYE SYMPTOMS: No  HEART SYMPTOMS: No  LUNG SYMPTOMS: No  INTESTINAL SYMPTOMS: No  URINARY SYMPTOMS: No  REPRODUCTIVE SYMPTOMS: No  SKELETAL SYMPTOMS: Yes  BLOOD SYMPTOMS: No  NERVOUS SYSTEM SYMPTOMS: Yes  MENTAL HEALTH SYMPTOMS: No  Back pain: No  Muscle aches: Yes  Neck pain: No  Swollen joints: Yes  Joint pain: Yes  Bone pain: No  Muscle cramps: Yes  Muscle weakness: Yes  Joint stiffness: No  Bone fracture: No  Trouble with coordination: Yes  Dizziness or trouble with balance: Yes  Fainting or black-out spells: No  Memory loss: No  Headache: No  Seizures: No  Speech problems: No  Tingling: No  Tremor: Yes  Weakness: Yes  Difficulty walking: Yes  Paralysis: No  Numbness: No      Again, thank you for allowing me to participate in the care of your patient.      Sincerely,    Anthony Velázquez MD

## 2019-11-21 NOTE — PROGRESS NOTES
Answers for HPI/ROS submitted by the patient on 11/21/2019   General Symptoms: No  Skin Symptoms: No  HENT Symptoms: No  EYE SYMPTOMS: No  HEART SYMPTOMS: No  LUNG SYMPTOMS: No  INTESTINAL SYMPTOMS: No  URINARY SYMPTOMS: No  REPRODUCTIVE SYMPTOMS: No  SKELETAL SYMPTOMS: Yes  BLOOD SYMPTOMS: No  NERVOUS SYSTEM SYMPTOMS: Yes  MENTAL HEALTH SYMPTOMS: No  Back pain: No  Muscle aches: Yes  Neck pain: No  Swollen joints: Yes  Joint pain: Yes  Bone pain: No  Muscle cramps: Yes  Muscle weakness: Yes  Joint stiffness: No  Bone fracture: No  Trouble with coordination: Yes  Dizziness or trouble with balance: Yes  Fainting or black-out spells: No  Memory loss: No  Headache: No  Seizures: No  Speech problems: No  Tingling: No  Tremor: Yes  Weakness: Yes  Difficulty walking: Yes  Paralysis: No  Numbness: No

## 2019-11-21 NOTE — LETTER
RE: Mian Greenfield  [address] [address]       Service Date: 2019      Torsten Lawrence MD   [address] [address]      RE: Mian Greenfield    MRN: [id number]   : 1980      Dear Dr. Lawrence:       Mr. 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  The patient has been seen here in the [facility] and has also been seen in the Genetics division of the [facility].  He was screened for hereditary spastic paraplegia, and there was genetic testing done on that, and that turned out to be negative.  He has been seen in the [facility] by Dr. Slaughter, who felt his MRI did show cerebellar atrophy in the vermis region.  He had further testing, including PIA antibodies and treatable causes of ataxia, and next-generation sequencing was done to rule out a spastic paraplegia type of picture.  The ceruloplasmin was initially found low, and this was repeated, and he did have the next-generation sequencing.  The ceruloplasmin was 25.  He did have a copper level drawn, which was 85.3.  No family history is present.  He returns today, and he feels that he is a little worse with his ataxia.  We repeated the MRI, and compared to 2017, the cerebellum appears to show more atrophy, especially in the vermis.      He has reported what appears to be some myoclonus of the left shoulder.  He has had no seizures.  He has been maintained on levetiracetam 1000 twice a day.      PHYSICAL EXAMINATION:  I reexamined him today, and he still cannot do tandem at all.  He has finger-to-nose ataxia and rapid movement ataxia.  No significant nystagmus.  Mild ptosis on the right side,  very brisk reflexes and spasticity present.  Lower extremity also showed dysmetria.  Sensory exam is not conducted today.      In summary, a combination of ataxia with pyramidal findings.  Seizures are part of the picture.  He is  unable to work, and we will do disability forms.  We will try him on some baclofen 5 mg t.i.d. for the spasticity to see if it helps with ambulation.  We will consult Dr. Slaughter to see if he has any clinical trials going on for this condition.      We told him that the MRI deteriorated slightly, but not terribly.  He is accepting his diagnosis.  He has been able to drive.  He has had no seizure for many years.  He will stay on the Keppra, and we will do levels today.      Sincerely,        Anthony Velázquez MD      cc:   Jacky Slaughter MD   [facility]    [address] [place] [address]      D: 2019   T: 2019   MT: harry      Name:     KARENA BOWMAN   MRN:      [id number]        Account:      [id number]   :      1980           Service Date: 2019      Document: [id number]

## 2019-11-22 LAB — LEVETIRACETAM SERPL-MCNC: 20 UG/ML (ref 12–46)

## 2019-11-22 NOTE — PROGRESS NOTES
Service Date: 2019      [place] [address]      RE: Mian Greenfield    MRN: [id number]   : 1980      Dear Dr. Lawrence:       Mr. 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  The patient has been seen here in the [facility] and has also been seen in the Genetics division of the [facility].  He was screened for hereditary spastic paraplegia, and there was genetic testing done on that, and that turned out to be negative.  He has been seen in [facility] by Dr. Slaughter, who felt his MRI did show cerebellar atrophy in the vermis region.  He had further testing, including PIA antibodies and treatable causes of ataxia, and next-generation sequencing was done to rule out a spastic paraplegia type of picture.  The ceruloplasmin was initially found low, and this was repeated, and he did have the next-generation sequencing.  The ceruloplasmin was 25.  He did have a copper level drawn, which was 85.3.  No family history is present.  He returns today, and he feels that he is a little worse with his ataxia.  We repeated the MRI, and compared to 2017, the cerebellum appears to show more atrophy, especially in the vermis.      He has reported what appears to be some myoclonus of the left shoulder.  He has had no seizures.  He has been maintained on levetiracetam 1000 twice a day.      PHYSICAL EXAMINATION:  I reexamined him today, and he still cannot do tandem at all.  He has finger-to-nose ataxia and rapid movement ataxia.  No significant nystagmus.  Mild ptosis on the right side,  very brisk reflexes and spasticity present.  Lower extremity also showed dysmetria.  Sensory exam is not conducted today.      In summary, a combination of ataxia with pyramidal findings.  Seizures are part of the picture.  He is unable to work, and we will do disability forms.  We will try him  on some baclofen 5 mg t.i.d. for the spasticity to see if it helps with ambulation.  We will consult Dr. Slaughter to see if he has any clinical trials going on for this condition.      We told him that the MRI deteriorated slightly, but not terribly.  He is accepting his diagnosis.  He has been able to drive.  He has had no seizure for many years.  He will stay on the Keppra, and we will do levels today.      Sincerely,        Anthony Mcdonnell MD      cc:   Jacky Slaughter MD   [facility]    [address] [place] [address]         ANTHONY MCDONNELL MD             D: 2019   T: 2019   MT: harry      Name:     KARENA BOWMAN   MRN:      [id number]        Account:      [id number]   :      1980           Service Date: 2019      Document: [id number]

## 2020-01-06 ENCOUNTER — TELEPHONE (OUTPATIENT)
Dept: NEUROLOGY | Facility: CLINIC | Age: 40
End: 2020-01-06

## 2020-01-06 NOTE — TELEPHONE ENCOUNTER
[facility] Call Center    Phone Message    May a detailed message be left on voicemail: yes    Reason for Call: renew seizure form, also wanting to know if Dr. Velázquez would be okay with filling out a form for to help him renew his drivers license?       Please advise and let him know if he can send the forms over    Action Taken: Message routed to:  [facility] ([facility] NEURO

## 2020-01-13 NOTE — TELEPHONE ENCOUNTER
Spoke with Dr. Velázquez in regard to below note; Dr. Velázquez indicated that from a seizure stand point he is ok to drive again, but that he wanted to find out how Dr. Slaughter felt about him driving from the stand point of Ataxia.    Spoke with Dr. Slaughter when he was available and he also felt that patient was safe to drive.    Placed call to patient and left voice mail with this information, and asking if he has a form or needs on.  Asked patient to call back. Left  call back number and name.     Patient immediately called back indicating he would fax a form he has.

## 2020-01-21 NOTE — TELEPHONE ENCOUNTER
Form received from patient, completed and signed by MD.  Faxed to [place] [employer], Copy sent to be scanned into EHR, and copy sent to patient at address on file.  Phone call made to patient to inform them.

## 2020-02-07 ENCOUNTER — TELEPHONE (OUTPATIENT)
Dept: NEUROLOGY | Facility: CLINIC | Age: 40
End: 2020-02-07

## 2020-02-07 NOTE — TELEPHONE ENCOUNTER
What is the concern that needs to be addressed by a nurse? Pt would like a call back to discuss a fall he had last week.     May a detailed message be left on voicemail? Yes     Date of last office visit: 11/21/19    Message routed to: [facility] RN Pool

## 2020-02-07 NOTE — TELEPHONE ENCOUNTER
Returned call to patient who indicates that he fell last week and hurt is knee badly, he tried using a wrap on it, but it was not helpful. He would like Dr. Velázquez to order a knee brace for him so it would be covered by insurance.  He indicates the is a medical supply co in [place] that is near him.

## 2020-02-10 NOTE — TELEPHONE ENCOUNTER
Spoke with Dr. Velázquez about knee brace, but there were so many variable and braces that felt patient should be seen by local MD who can exam him and work out what is needed.    Called patient with this information and patient is agreeable.

## 2020-03-02 ENCOUNTER — HEALTH MAINTENANCE LETTER (OUTPATIENT)
Age: 40
End: 2020-03-02

## 2020-05-19 ENCOUNTER — VIRTUAL VISIT (OUTPATIENT)
Dept: NEUROLOGY | Facility: CLINIC | Age: 40
End: 2020-05-19
Payer: COMMERCIAL

## 2020-05-19 DIAGNOSIS — R27.0 ATAXIA: Primary | ICD-10-CM

## 2020-05-19 NOTE — PROGRESS NOTES
"Mian Greenfield is a 40 year old male who is being evaluated via a billable video visit.      The patient has been notified of following:     \"This video visit will be conducted via a call between you and your physician/provider. We have found that certain health care needs can be provided without the need for an in-person physical exam.  This service lets us provide the care you need with a video conversation.  If a prescription is necessary we can send it directly to your pharmacy.  If lab work is needed we can place an order for that and you can then stop by our lab to have the test done at a later time.    Video visits are billed at different rates depending on your insurance coverage.  Please reach out to your insurance provider with any questions.    If during the course of the call the physician/provider feels a video visit is not appropriate, you will not be charged for this service.\"    Patient has given verbal consent for Video visit? Yes      Patient would like the video invitation sent by: no    Will anyone else be joining your video visit?     Video-Visit Details none ocurred      No visit rescheduled by patient  shyanneryan  Called x 3 no response  Carlito Velarde, EMT        "

## 2020-06-26 ENCOUNTER — DOCUMENTATION ONLY (OUTPATIENT)
Dept: NEUROLOGY | Facility: CLINIC | Age: 40
End: 2020-06-26

## 2020-06-26 NOTE — PROGRESS NOTES
Medical Opinion form has been received and has been scanned to email to Dr. Velázquez for review and signature

## 2020-06-29 NOTE — PROGRESS NOTES
Received signed Medical Opinion document from Dr. Velázquez.    Called patient for number to fax to and was sent via fax to [phone] on 6/29/20.  Copy mailed to patient 6/29/20.   normal...

## 2020-07-06 ENCOUNTER — VIRTUAL VISIT (OUTPATIENT)
Dept: NEUROLOGY | Facility: CLINIC | Age: 40
End: 2020-07-06
Payer: COMMERCIAL

## 2020-07-06 DIAGNOSIS — R27.0 ATAXIA: Primary | ICD-10-CM

## 2020-07-06 DIAGNOSIS — R56.9 SEIZURES (H): ICD-10-CM

## 2020-07-06 DIAGNOSIS — G11.4 HEREDITARY SPASTIC PARAPLEGIA (H): ICD-10-CM

## 2020-07-06 DIAGNOSIS — R27.9 LACK OF COORDINATION: ICD-10-CM

## 2020-07-06 DIAGNOSIS — G40.909 SEIZURE DISORDER (H): ICD-10-CM

## 2020-07-06 RX ORDER — LEVETIRACETAM 500 MG/1
1000 TABLET ORAL 2 TIMES DAILY
Qty: 360 TABLET | Refills: 11 | Status: SHIPPED | OUTPATIENT
Start: 2020-07-06 | End: 2021-08-26

## 2020-07-06 NOTE — LETTER
2020      RE: Mian Greenfield  [address] [facility] [address]       Mian Greenfield is a 40 year old male who is being evaluated via a billable video visit.          Video-Visit Details    Type of service:  Telephone Visit x 30 min    Distant Location (provider location):  [facility] NEUROLOGY     ck Awadey Syd, EMT    Service Date: 2020      Torsten Lawrence MD   [facility]   [address] [place] [address]      RE: Mian Greenfield   MRN: [id number]   : 1980      Dear Dr. Lawrence:      This patient underwent a telephone visit for a period of 30 minutes.  We attempted video, but it was impossible to do it.  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  He had the ataxia panel done by Genetics with no particular mutation identified.  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  Sequencing has not been done and it may be done in the future.  In the meantime, he has been disabled, and we signed his disability forms.  He also has seizures as part of the neurological problem.  He says he continues perhaps to deteriorate or perhaps a little bit with some falling.  He has been on baclofen 5 mg 3 times a day.  He has no leg weakness, but says he still has a lot of spasticity on his thighs and tightness of the hip areas.  There is concern about hip problems.  He has been on levetiracetam 1000 mg twice a day.  His last concentration with me on previous visit in November was 20.  He has not had seizures for multiple years.  He has not been using alcohol.  On my last visit, his lactic acid was slightly elevated at 3.0.  His ceruloplasmin has been on the low side, so we repeated that, as well as a serum copper, which was upper ranges are normal.  He has not had any physical therapy.  He is concerned about his safety.  We will refer him to [facility] for physical therapy.      We will also send a note to Dr. Slaughter about allowing him to be in a clinical  trial for ataxia if it is available.  He has some questions about the dose of baclofen.  He is on 5 mg 3 times a day.  We will check the medication*** now.      Depression is not very severe.  He denies any suicidal thinking.  He has a son, which he does not have any problems.  His speech sounds about the same.  On examination, blood pressure was 142/89.  No other medications other than he has been taking some multivitamins and the anti-seizure meds.  Plan to maintain the same program, get physical therapy, try to enroll him in a clinical trial and perhaps increase the dose of baclofen.      Sincerely,         Anthony Velázquez MD      cc:   Jacky Slaughter MD   [id number]   [place] [address]                    D: 2020   T: 2020   MT: al      Name:     KARENA BOWMAN   MRN:      [id number]        Account:      [id number]   :      1980           Service Date: 2020      Document: [id number]

## 2020-07-06 NOTE — PROGRESS NOTES
"Mian Greenfield is a 40 year old male who is being evaluated via a billable video visit.      The patient has been notified of following:     \"This video visit will be conducted via a call between you and your physician/provider. We have found that certain health care needs can be provided without the need for an in-person physical exam.  This service lets us provide the care you need with a video conversation.  If a prescription is necessary we can send it directly to your pharmacy.  If lab work is needed we can place an order for that and you can then stop by our lab to have the test done at a later time.    Video visits are billed at different rates depending on your insurance coverage.  Please reach out to your insurance provider with any questions.    If during the course of the call the physician/provider feels a video visit is not appropriate, you will not be charged for this service.\"    Patient has given verbal consent for Video visit? yes  How would you like to obtain your AVS?yes         Video-Visit Details    Type of service:  Telephone Visit x 30 min        Distant Location (provider location):  [facility] NEUROLOGY     ck Velarde, EMT  "

## 2020-07-06 NOTE — LETTER
"7/6/2020       RE: Mian Greenfield  [address] [place] [address]     Dear Colleague,    Thank you for referring your patient, Mian Greenfield, to the [facility] NEUROLOGY at [facility]. Please see a copy of my visit note below.    Mian Greenfield is a 40 year old male who is being evaluated via a billable video visit.      The patient has been notified of following:     \"This video visit will be conducted via a call between you and your physician/provider. We have found that certain health care needs can be provided without the need for an in-person physical exam.  This service lets us provide the care you need with a video conversation.  If a prescription is necessary we can send it directly to your pharmacy.  If lab work is needed we can place an order for that and you can then stop by our lab to have the test done at a later time.    Video visits are billed at different rates depending on your insurance coverage.  Please reach out to your insurance provider with any questions.    If during the course of the call the physician/provider feels a video visit is not appropriate, you will not be charged for this service.\"    Patient has given verbal consent for Video visit? yes  How would you like to obtain your AVS?yes         Video-Visit Details    Type of service:  Telephone Visit x 30 min        Distant Location (provider location):  [facility] NEUROLOGY     ck Velarde, EMT    Again, thank you for allowing me to participate in the care of your patient.      Sincerely,    Anthony Velázquez MD      "

## 2020-07-08 DIAGNOSIS — R27.0 ATAXIA: ICD-10-CM

## 2020-07-08 LAB — LACTATE BLD-SCNC: 2 MMOL/L (ref 0.7–2)

## 2020-07-08 PROCEDURE — 99000 SPECIMEN HANDLING OFFICE-LAB: CPT | Performed by: FAMILY MEDICINE

## 2020-07-08 PROCEDURE — 36415 COLL VENOUS BLD VENIPUNCTURE: CPT | Mod: ZL | Performed by: PSYCHIATRY & NEUROLOGY

## 2020-07-08 PROCEDURE — 82525 ASSAY OF COPPER: CPT | Mod: ZL | Performed by: PSYCHIATRY & NEUROLOGY

## 2020-07-08 PROCEDURE — 82390 ASSAY OF CERULOPLASMIN: CPT | Mod: ZL | Performed by: PSYCHIATRY & NEUROLOGY

## 2020-07-08 PROCEDURE — 83605 ASSAY OF LACTIC ACID: CPT | Mod: ZL | Performed by: PSYCHIATRY & NEUROLOGY

## 2020-07-08 PROCEDURE — 80177 DRUG SCRN QUAN LEVETIRACETAM: CPT | Mod: ZL | Performed by: PSYCHIATRY & NEUROLOGY

## 2020-07-09 NOTE — PROGRESS NOTES
Service Date: 2020      Torsten Lawrence MD   [facility]   [address] [place] [address]      RE: Mian Greenfield   MRN: [id number]   : 1980      Dear Dr. Lawrence:      This patient underwent a telephone visit for a period of 30 minutes.  We attempted video, but it was impossible to do it.  The patient had ataxia diagnosed and it does have  component.  He had the ataxia panel done by Genetics with no particular mutation identified.  He does have cerebellar atrophy and a mutation.  Sequencing has not been done and it may be done in the future.  In the meantime, he has been disabled, and we signed his disability forms.  He also has seizures as part of the neurological problem.  He says he continues perhaps to deteriorate or perhaps a little bit with some falling.  He has been on baclofen 5 mg 3 times a day.  He has no leg weakness, but says he still has a lot of spasticity on his thighs and tightness of the hip areas.  There is concern about hip problems.  He has been on levetiracetam 1000 mg twice a day.  His last concentration with me on previous visit in November was 20.  He has not had seizures for multiple years.  He has not been using alcohol.  On my last visit, his lactic acid was slightly elevated at 3.0.  His ceruloplasmin has been on the low side, so we repeated that, as well as a serum copper, which was upper ranges are normal.  He has not had any physical therapy.  He is concerned about his safety.  We will refer him to [facility] for physical therapy.      We will also send a note to Dr. Slaughter about allowing him to be in a clinical trial for ataxia if it is available.  He has some questions about the dose of baclofen.  He is on 5 mg 3 times a day.  We will check the medication now.      Depression is not very severe.  He denies any suicidal thinking.  He has a son, which he does not have any problems.  His speech sounds about the same.  On examination, blood pressure  was 142/89.  No other medications other than he has been taking some multivitamins and the anti-seizure meds.  Plan to maintain the same program, get physical therapy, try to enroll him in a clinical trial and perhaps increase the dose of baclofen. COPPER ISSUE TO BE LOOKED AT AGAIN. LOW COPPER AND CERUPLASMIN SEEN AGAIN.     Sincerely,         José Manuel Mcdonnell MD      cc:   Jacky Slaughter MD   [address] [id number]   [place] [address]         JOSÉ MANUEL MCDONNELL MD             D: 2020   T: 2020   MT: al      Name:     KARENA BOWMAN   MRN:      [id number]        Account:      [id number]   :      1980           Service Date: 2020      Document: [id number]

## 2020-07-10 LAB
CERULOPLASMIN SERPL-MCNC: 21 MG/DL (ref 20–60)
LEVETIRACETAM SERPL-MCNC: 29 UG/ML (ref 12–46)

## 2020-07-11 LAB — COPPER SERPL-MCNC: 64.2 UG/DL (ref 70–140)

## 2020-08-07 ENCOUNTER — TELEPHONE (OUTPATIENT)
Dept: NEUROLOGY | Facility: CLINIC | Age: 40
End: 2020-08-07

## 2020-08-07 DIAGNOSIS — R79.0 LOW SERUM COPPER FOR AGE: Primary | ICD-10-CM

## 2020-08-19 DIAGNOSIS — R79.0 LOW SERUM COPPER FOR AGE: ICD-10-CM

## 2020-08-20 DIAGNOSIS — R79.0 LOW SERUM COPPER FOR AGE: ICD-10-CM

## 2020-08-20 PROCEDURE — 82525 ASSAY OF COPPER: CPT | Mod: ZL | Performed by: PSYCHIATRY & NEUROLOGY

## 2020-08-20 PROCEDURE — 99000 SPECIMEN HANDLING OFFICE-LAB: CPT | Performed by: FAMILY MEDICINE

## 2020-09-02 LAB
COLLECT DURATION TIME UR: 24 HR
COPPER 24H UR-MRATE: 15 UG/D (ref 3–45)
COPPER ?TM UR-MCNC: 1.5 UG/DL (ref 0.3–3.2)
COPPER/CREAT 24H UR: 8.2 UG/G CRT (ref 10–45)
CREAT 24H UR-MRATE: 1830 MG/D (ref 1000–2500)
CREAT UR-MCNC: 183 MG/DL
SPECIMEN VOL ?TM UR: 1000 ML

## 2020-09-03 ENCOUNTER — CARE COORDINATION (OUTPATIENT)
Dept: NEUROLOGY | Facility: CLINIC | Age: 40
End: 2020-09-03

## 2020-09-03 ENCOUNTER — TELEPHONE (OUTPATIENT)
Dept: NEUROLOGY | Facility: CLINIC | Age: 40
End: 2020-09-03

## 2020-09-03 DIAGNOSIS — R27.0 ATAXIA: Primary | ICD-10-CM

## 2020-09-03 DIAGNOSIS — R27.9 LACK OF COORDINATION: Primary | ICD-10-CM

## 2020-09-03 DIAGNOSIS — R27.0 ATAXIA: ICD-10-CM

## 2020-09-03 NOTE — PROGRESS NOTES
Pt hepatic panel order faxed to his PCP to be done at his home clinic. Fax: [phone].     Jailene ZAMORA

## 2020-09-21 ENCOUNTER — TELEPHONE (OUTPATIENT)
Dept: NEUROLOGY | Facility: CLINIC | Age: 40
End: 2020-09-21

## 2020-09-21 NOTE — TELEPHONE ENCOUNTER
I called pt back to discuss his referrals. He was referred to ophthalmology and gastroenterology by Dr. Velázquez. I will have scheduling give him a call to help him set up a visit; hopefully both on the same day as he has a long drive. He is wondering if it would be possible to see someone closer to home. I let him know the schedulers will be able to see if there is any [place] clinics near him that would work.     Jailene ZAMORA

## 2020-09-21 NOTE — TELEPHONE ENCOUNTER
Health Call Center    Phone Message    May a detailed message be left on voicemail: yes     Reason for Call: Other: Patient calling is regards to a conversation patient had with Dr. Velázquez about another appointment in a different clinic. Patient is unsure of what appointment this is for and stated he never received a call or more information about this.     Please advise and call patient back    Action Taken: Other:  NEUROLOGY     Travel Screening: Not Applicable

## 2020-09-30 ENCOUNTER — HOSPITAL ENCOUNTER (OUTPATIENT)
Dept: PHYSICAL THERAPY | Facility: HOSPITAL | Age: 40
Setting detail: THERAPIES SERIES
End: 2020-09-30
Attending: PSYCHIATRY & NEUROLOGY
Payer: COMMERCIAL

## 2020-09-30 DIAGNOSIS — R27.0 ATAXIA: ICD-10-CM

## 2020-09-30 LAB
ALBUMIN SERPL-MCNC: 4.3 G/DL (ref 3.4–5)
ALP SERPL-CCNC: 80 U/L (ref 40–150)
ALT SERPL W P-5'-P-CCNC: 31 U/L (ref 0–70)
AST SERPL W P-5'-P-CCNC: 10 U/L (ref 0–45)
BILIRUB DIRECT SERPL-MCNC: 0.1 MG/DL (ref 0–0.2)
BILIRUB SERPL-MCNC: 0.5 MG/DL (ref 0.2–1.3)
PROT SERPL-MCNC: 7.6 G/DL (ref 6.8–8.8)

## 2020-09-30 PROCEDURE — 97110 THERAPEUTIC EXERCISES: CPT | Mod: GP | Performed by: PHYSICAL THERAPIST

## 2020-09-30 PROCEDURE — 80076 HEPATIC FUNCTION PANEL: CPT | Mod: ZL | Performed by: PSYCHIATRY & NEUROLOGY

## 2020-09-30 PROCEDURE — 36415 COLL VENOUS BLD VENIPUNCTURE: CPT | Mod: ZL | Performed by: PSYCHIATRY & NEUROLOGY

## 2020-09-30 PROCEDURE — 97162 PT EVAL MOD COMPLEX 30 MIN: CPT | Mod: GP | Performed by: PHYSICAL THERAPIST
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
## 2020-10-13 ENCOUNTER — HOSPITAL ENCOUNTER (OUTPATIENT)
Dept: PHYSICAL THERAPY | Facility: HOSPITAL | Age: 40
Setting detail: THERAPIES SERIES
End: 2020-10-13
Attending: PSYCHIATRY & NEUROLOGY
Payer: COMMERCIAL

## 2020-10-13 PROCEDURE — 97110 THERAPEUTIC EXERCISES: CPT | Mod: GP | Performed by: PHYSICAL THERAPIST

## 2020-10-16 ENCOUNTER — HOSPITAL ENCOUNTER (OUTPATIENT)
Dept: PHYSICAL THERAPY | Facility: HOSPITAL | Age: 40
Setting detail: THERAPIES SERIES
End: 2020-10-16
Attending: PSYCHIATRY & NEUROLOGY
Payer: COMMERCIAL

## 2020-10-16 PROCEDURE — 97110 THERAPEUTIC EXERCISES: CPT | Mod: GP | Performed by: PHYSICAL THERAPIST

## 2020-10-20 ENCOUNTER — HOSPITAL ENCOUNTER (OUTPATIENT)
Dept: PHYSICAL THERAPY | Facility: HOSPITAL | Age: 40
Setting detail: THERAPIES SERIES
End: 2020-10-20
Attending: PSYCHIATRY & NEUROLOGY
Payer: COMMERCIAL

## 2020-10-20 PROCEDURE — 97112 NEUROMUSCULAR REEDUCATION: CPT | Mod: GP | Performed by: PHYSICAL THERAPIST

## 2020-10-22 ENCOUNTER — HOSPITAL ENCOUNTER (OUTPATIENT)
Dept: PHYSICAL THERAPY | Facility: HOSPITAL | Age: 40
Setting detail: THERAPIES SERIES
End: 2020-10-22
Attending: PSYCHIATRY & NEUROLOGY
Payer: COMMERCIAL

## 2020-10-22 PROCEDURE — 97112 NEUROMUSCULAR REEDUCATION: CPT | Mod: GP | Performed by: PHYSICAL THERAPIST

## 2020-12-09 ENCOUNTER — TELEPHONE (OUTPATIENT)
Dept: NEUROLOGY | Facility: CLINIC | Age: 40
End: 2020-12-09

## 2020-12-09 NOTE — TELEPHONE ENCOUNTER
CAlled pt, no answer, left message for him to return this call.        [facility] Call Center    Phone Message    May a detailed message be left on voicemail: yes     Reason for Call: Other: Mian calling to request a call back. He would like to speak to the care team in regards to his ataxia. Please call him back to discuss.      Action Taken: Message routed to:  [facility] ([facility]  neuro     Travel Screening: Not Applicable

## 2020-12-11 ENCOUNTER — TELEPHONE (OUTPATIENT)
Dept: NEUROLOGY | Facility: CLINIC | Age: 40
End: 2020-12-11

## 2020-12-11 NOTE — TELEPHONE ENCOUNTER
CAlled pt, he said he is having problems with pain in his legs and wants to discuss the baclofen. He has not seen Dr. Slaughter since 2018. Pt does not have a computer and the camera on his phone does not work so this will be a phone visit only. He was scheduled for 7:30 on Dec. 18.

## 2020-12-18 ENCOUNTER — VIRTUAL VISIT (OUTPATIENT)
Dept: NEUROLOGY | Facility: CLINIC | Age: 40
End: 2020-12-18
Payer: COMMERCIAL

## 2020-12-18 DIAGNOSIS — R27.0 ATAXIA: Primary | ICD-10-CM

## 2020-12-18 PROCEDURE — 99207 PR SATISFY VISIT NUMBER: CPT | Mod: 25 | Performed by: PSYCHIATRY & NEUROLOGY

## 2020-12-18 NOTE — LETTER
"12/18/2020       RE: Mian Greenfield  [address] [place] [id number]     Dear Colleague,    Thank you for referring your patient, Mian Greenfield, to the [facility] [place] at [facility]. Please see a copy of my visit note below.    Attempted to call x4.  No answer.  LVM.  Mila Be MD  Movement Disorders Fellow    Mian Greenfield is a 40 year old male who is being evaluated via a billable telephone visit.      The patient has been notified of following:     \"This telephone visit will be conducted via a call between you and your physician/provider. We have found that certain health care needs can be provided without the need for a physical exam.  This service lets us provide the care you need with a short phone conversation.  If a prescription is necessary we can send it directly to your pharmacy.  If lab work is needed we can place an order for that and you can then stop by our lab to have the test done at a later time.    Telephone visits are billed at different rates depending on your insurance coverage. During this emergency period, for some insurers they may be billed the same as an in-person visit.  Please reach out to your insurance provider with any questions.    If during the course of the call the physician/provider feels a telephone visit is not appropriate, you will not be charged for this service.\"    Patient has given verbal consent for Telephone visit?  YES    What phone number would you like to be contacted at? [phone]    How would you like to obtain your AVS? [email or web address]    Phone call duration: 0 minutes    TALIA Talamantes      Again, thank you for allowing me to participate in the care of your patient.      Sincerely,    Jacky Slaughter MD      "

## 2020-12-18 NOTE — PROGRESS NOTES
"Mian Greenfield is a 40 year old male who is being evaluated via a billable telephone visit.      The patient has been notified of following:     \"This telephone visit will be conducted via a call between you and your physician/provider. We have found that certain health care needs can be provided without the need for a physical exam.  This service lets us provide the care you need with a short phone conversation.  If a prescription is necessary we can send it directly to your pharmacy.  If lab work is needed we can place an order for that and you can then stop by our lab to have the test done at a later time.    Telephone visits are billed at different rates depending on your insurance coverage. During this emergency period, for some insurers they may be billed the same as an in-person visit.  Please reach out to your insurance provider with any questions.    If during the course of the call the physician/provider feels a telephone visit is not appropriate, you will not be charged for this service.\"    Patient has given verbal consent for Telephone visit?  YES    What phone number would you like to be contacted at? [phone]    How would you like to obtain your AVS? [facility]    Phone call duration: 0 minutes    Carlito Velarde EMT  "

## 2020-12-20 ENCOUNTER — HEALTH MAINTENANCE LETTER (OUTPATIENT)
Age: 40
End: 2020-12-20

## 2021-01-07 ENCOUNTER — TELEPHONE (OUTPATIENT)
Dept: NEUROLOGY | Facility: CLINIC | Age: 41
End: 2021-01-07

## 2021-01-07 NOTE — TELEPHONE ENCOUNTER
Pt does not need a follow up MRI, he is a return pt and he is being seen to discuss changes in his condition.    [facility] Call Center    Phone Message    May a detailed message be left on voicemail: yes     Reason for Call: Other: Patient calling to reschedule appointment from 12/18 with Dr. Slaughter - patient states he has no video capabilities so writer rescheduled for telephone visit on 2/5 with Dr. Slaughter. Per protocols states patient must have had a brain MRI prior to scheduling. Priscila was rescheduling an appointment from 12/18, but no orders are in chart for MRI.       Please advise     Action Taken: Other: [facility]    Travel Screening: Not Applicable

## 2021-02-05 ENCOUNTER — VIRTUAL VISIT (OUTPATIENT)
Dept: NEUROLOGY | Facility: CLINIC | Age: 41
End: 2021-02-05
Payer: COMMERCIAL

## 2021-02-05 DIAGNOSIS — R27.0 ATAXIA: Primary | ICD-10-CM

## 2021-02-05 PROCEDURE — 99214 OFFICE O/P EST MOD 30 MIN: CPT | Mod: TEL | Performed by: PSYCHIATRY & NEUROLOGY

## 2021-02-05 NOTE — LETTER
"2021       RE: Mian Greenfield  [address] [place] [id number]     Dear Colleague,    Thank you for referring your patient, Mian Greenfield, to the [facility] [place] [facility]. Please see a copy of my visit note below.    Telephone-Visit Details  The patient has been notified of following:     \"After a review of the patient s situation, this visit was changed from an in-person visit to a telephone visit to reduce the risk of COVID-19 exposure.   The patient is being evaluated via a billable telephone visit.\"    \"This Telephone visit will be conducted via a call between you and your physician/provider. We have found that certain health care needs can be provided without the need for an in-person physical exam.  This service lets us provide the care you need with a telephone  conversation.  If a prescription is necessary we can send it directly to your pharmacy.  If lab work is needed we can place an order for that and you can then stop by our lab to have the test done at a later time.    If during the course of the call the physician/provider feels a telephone visit is not appropriate, you will not be charged for this service.\"     Patient has given verbal consent for Telephone visit? Yes    Type of service:  Telephone visit     Duration of Visit: 9:40 AM-9:53 AM, 13 mins    Originating Location (pt. Location): home    Distant Location (provider location):  [facility] [place]     Mode of Communication:  Telephone  -----------------------------------------------------------  Department of Neurology  Movement Disorders Division   Follow-up Note    Patient: Mian Greenfield   MRN: [id number]   : 1980   Date of Visit: 2021    INTERVAL EVENTS:  Mr. Greenfield is a 40 yo man who return for follow-up of ataxia.  He was last seen by Dr. Slaughter on 2018.  In the interim he has followed with Dr." Melania.    Genetic testing was negative for HSP.  On initial evaluation upper and lower extremities showed diffuse hyperreflexia and lower extremity spasticity and some dysmetria of the upper extremities.  His gait was wide-based and ataxic.  MRI brain showed cerebellar atrophy.     He is wondering if there is anything he can take for muscle spasms.  This occur all over his body but notice more in his legs.  There is some cramping.  The spasms aren't painful.  It is like a tremor, shaking in his legs.  Also causes him to kick his legs out. When using a bike his legs kept flying off the pedals.  He avoids public due to this tremoring.  The more he tries to control it, the worse it get.    Gait is stiff.    Takes baclofen when he goes to the grocery and his hips swell up.      Related Medications   Baclofen 5mg PRN       ROS:  All others negative except as listed above.    Past Medical History:   Diagnosis Date     Alcohol abuse 10/3/2013     Insomnia 7/29/2015     Raynaud's syndrome 01/31/2011     Scoliosis (and kyphoscoliosis), idiopathic 04/03/2001     Seizure disorder (H) 7/29/2015    Dr chavez; Lamictal; after 2 years, can try tapering     Seizures (H)         Past Surgical History:   Procedure Laterality Date     NO HISTORY OF SURGERY          Current Outpatient Medications   Medication Sig Dispense Refill     Baclofen 5 MG TABS Take 5 mg by mouth 3 times daily 1 tab three times a day 90 tablet 11     levETIRAcetam (KEPPRA) 500 MG tablet Take 2 tablets (1,000 mg) by mouth 2 times daily 360 tablet 11     multivitamin, therapeutic with minerals (THERA-VIT-M) TABS Take 1 tablet by mouth daily 30 each 0     thiamine 100 MG tablet Take 1 tablet (100 mg) by mouth daily 1 tablet 3       Allergies   Allergen Reactions     Bee Venom         Family History   Problem Relation Age of Onset     Depression Mother      Blood Disease Father         bacterial infection     Alcohol/Drug Father      Depression Father      Thyroid  Disease Sister      Thyroid Disease Sister      Diabetes Sister      Heart Disease Maternal Grandfather         Social History     Socioeconomic History     Marital status: Single     Spouse name: Not on file     Number of children: Not on file     Years of education: Not on file     Highest education level: Not on file   Occupational History     Not on file   Social Needs     Financial resource strain: Not on file     Food insecurity     Worry: Not on file     Inability: Not on file     Transportation needs     Medical: Not on file     Non-medical: Not on file   Tobacco Use     Smoking status: Never Smoker     Smokeless tobacco: Current User     Types: Snuff   Substance and Sexual Activity     Alcohol use: No     Drug use: No     Sexual activity: Not Currently     Partners: Female   Lifestyle     Physical activity     Days per week: Not on file     Minutes per session: Not on file     Stress: Not on file   Relationships     Social connections     Talks on phone: Not on file     Gets together: Not on file     Attends [employer] service: Not on file     Active member of club or organization: Not on file     Attends meetings of clubs or organizations: Not on file     Relationship status: Not on file     Intimate partner violence     Fear of current or ex partner: Not on file     Emotionally abused: Not on file     Physically abused: Not on file     Forced sexual activity: Not on file   Other Topics Concern     Parent/sibling w/ CABG, MI or angioplasty before 65F 55M? 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 One child who is 2 years old.         PHYSICAL EXAM:  There were no vitals taken for this visit.      ASSESSMENT/PLAN:  Mr. Greenfield is a 40 yo man who return for follow-up of ataxia, spasticity, and cerebellar atrophy.  He is having involuntary leg movements and only taking baclofen PRN.    - Increase baclofen to 5mg TID scheduled  - RTC 6 weeks      Mila Be MD  Movement Disorders Fellow      Mian is a 41 year old who is being evaluated via a billable telephone visit.      What phone number would you like to be contacted at? [phone]    How would you like to obtain your AVS? Mychart    Phone call duration: 13 minutes      I have personally interviewed  Mr. Mian Greenfield and agree with diagnosis and management. The total time spent with the patient was 25 minutes, over 50% of the time spent in counseling and coordinating care.        Again, thank you for allowing me to participate in the care of your patient.      Sincerely,    Jacky Slaughter MD

## 2021-02-05 NOTE — PROGRESS NOTES
"Telephone-Visit Details  The patient has been notified of following:     \"After a review of the patient s situation, this visit was changed from an in-person visit to a telephone visit to reduce the risk of COVID-19 exposure.   The patient is being evaluated via a billable telephone visit.\"    \"This Telephone visit will be conducted via a call between you and your physician/provider. We have found that certain health care needs can be provided without the need for an in-person physical exam.  This service lets us provide the care you need with a telephone  conversation.  If a prescription is necessary we can send it directly to your pharmacy.  If lab work is needed we can place an order for that and you can then stop by our lab to have the test done at a later time.    If during the course of the call the physician/provider feels a telephone visit is not appropriate, you will not be charged for this service.\"     Patient has given verbal consent for Telephone visit? Yes    Type of service:  Telephone visit     Duration of Visit: 9:40 AM-9:53 AM, 13 mins    Originating Location (pt. Location): home    Distant Location (provider location):  [facility] [place]     Mode of Communication:  Telephone  -----------------------------------------------------------  Department of Neurology  Movement Disorders Division   Follow-up Note    Patient: Mian Greenfield   MRN: [id number]   : 1980   Date of Visit: 2021    INTERVAL EVENTS:  Mr. Greenfield is a 40 yo man who return for follow-up of ataxia.  He was last seen by Dr. Slaughter on 2018.  In the interim he has followed with Dr. Velázquez.    Genetic testing was negative for HSP.  On initial evaluation upper and lower extremities showed diffuse hyperreflexia and lower extremity spasticity and some dysmetria of the upper extremities.  His gait was wide-based and ataxic.  MRI brain showed cerebellar atrophy.     He is wondering if there is " anything he can take for muscle spasms.  This occur all over his body but notice more in his legs.  There is some cramping.  The spasms aren't painful.  It is like a tremor, shaking in his legs.  Also causes him to kick his legs out. When using a bike his legs kept flying off the pedals.  He avoids public due to this tremoring.  The more he tries to control it, the worse it get.    Gait is stiff.    Takes baclofen when he goes to the grocery and his hips swell up.      Related Medications   Baclofen 5mg PRN       ROS:  All others negative except as listed above.    Past Medical History:   Diagnosis Date     Alcohol abuse 10/3/2013     Insomnia 7/29/2015     Raynaud's syndrome 01/31/2011     Scoliosis (and kyphoscoliosis), idiopathic 04/03/2001     Seizure disorder (H) 7/29/2015    Dr chavez; Lamictal; after 2 years, can try tapering     Seizures (H)         Past Surgical History:   Procedure Laterality Date     NO HISTORY OF SURGERY          Current Outpatient Medications   Medication Sig Dispense Refill     Baclofen 5 MG TABS Take 5 mg by mouth 3 times daily 1 tab three times a day 90 tablet 11     levETIRAcetam (KEPPRA) 500 MG tablet Take 2 tablets (1,000 mg) by mouth 2 times daily 360 tablet 11     multivitamin, therapeutic with minerals (THERA-VIT-M) TABS Take 1 tablet by mouth daily 30 each 0     thiamine 100 MG tablet Take 1 tablet (100 mg) by mouth daily 1 tablet 3       Allergies   Allergen Reactions     Bee Venom         Family History   Problem Relation Age of Onset     Depression Mother      Blood Disease Father         bacterial infection     Alcohol/Drug Father      Depression Father      Thyroid Disease Sister      Thyroid Disease Sister      Diabetes Sister      Heart Disease Maternal Grandfather         Social History     Socioeconomic History     Marital status: Single     Spouse name: Not on file     Number of children: Not on file     Years of education: Not on file     Highest education level: Not  on file   Occupational History     Not on file   Social Needs     Financial resource strain: Not on file     Food insecurity     Worry: Not on file     Inability: Not on file     Transportation needs     Medical: Not on file     Non-medical: Not on file   Tobacco Use     Smoking status: Never Smoker     Smokeless tobacco: Current User     Types: Snuff   Substance and Sexual Activity     Alcohol use: No     Drug use: No     Sexual activity: Not Currently     Partners: Female   Lifestyle     Physical activity     Days per week: Not on file     Minutes per session: Not on file     Stress: Not on file   Relationships     Social connections     Talks on phone: Not on file     Gets together: Not on file     Attends Yarsanism service: Not on file     Active member of club or organization: Not on file     Attends meetings of clubs or organizations: Not on file     Relationship status: Not on file     Intimate partner violence     Fear of current or ex partner: Not on file     Emotionally abused: Not on file     Physically abused: Not on file     Forced sexual activity: Not on file   Other Topics Concern     Parent/sibling w/ CABG, MI or angioplasty before 65F 55M? 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         PHYSICAL EXAM:  There were no vitals taken for this visit.      ASSESSMENT/PLAN:  Mr. Greenfield is a 40 yo man who return for follow-up of ataxia, spasticity, and cerebellar atrophy.  He is having involuntary leg movements and only taking baclofen PRN.    -  Increase baclofen to 5mg TID scheduled  - RTC 6 weeks      Mila Be MD  Movement Disorders Fellow

## 2021-02-05 NOTE — PROGRESS NOTES
Mian is a 41 year old who is being evaluated via a billable telephone visit.      What phone number would you like to be contacted at? [phone]    How would you like to obtain your AVS? Sherif    Phone call duration: 13 minutes

## 2021-02-08 NOTE — PROGRESS NOTES
I have personally interviewed  . Mian TATUM Jean Pierre and agree with diagnosis and management. The total time spent with the patient was 25 minutes, over 50% of the time spent in counseling and coordinating care.

## 2021-03-12 NOTE — PROGRESS NOTES
Outpatient Physical Therapy Discharge Note     Patient: Mian Greenfield  : 1980    Beginning/End Dates of Reporting Period:  2020 to 3/12/2021    Referring Provider: Dr. Anthony Velázquez    Therapy Diagnosis: impaired stability with functional mobility and ADLs due to ataxia     Client Self Report: On last attended session: Pt states he was able to trial his exercises, states that he didn't notice any change, states sometimes the right was worse than the left and sometimes it was oppositive.  And states sometimes it went good and other times not so good.      Objective Measurements:    Outcome Measures (most recent score):      Goals:  Goal Identifier STG 1   Goal Description Pt to be independent and compliant with HEP.   Target Date 10/14/20   Date Met      Progress: partially met- not consistent compliance     Goal Identifier LTG 1   Goal Description Pt to increase Functional Gait Assessment score to at least 24/30 to decrease risk for falls.   Target Date 20   Date Met      Progress: not met     Goal Identifier LTG 2   Goal Description Pt to decrease frequency of falls to 1 or less a month to decrease risk for injury.   Target Date 20   Date Met      Progress: not met     Goal Identifier LTG 3   Goal Description Pt to report at least 50% improvement in mobility to attend his son's sporting events with increased ease.   Target Date 20   Date Met      Progress: not met       Progress Toward Goals:   Not assessed this period.  Pt was seen for 5 PT visits working on LE strengthening and coordination.  There were no significant changes noted in that period of time.  Pt cancelled remaining sessions and did not attend any further PT services to do formal assessment of progress.    Plan:  Discharge from therapy.    Discharge:    Reason for Discharge: Patient has failed to attend further appointments.    Equipment Issued:     Discharge Plan: no plan established

## 2021-03-23 NOTE — PROGRESS NOTES
Assessment & Plan     Acute pain of left shoulder  Swelling and tenderness to left upper back.  Reviewed XR normal bone structure.  With swelling and tenderness noted on physical exam, plan to try consistent treatment with NSAID and baclofen.  He does not use his baclofen regularly, but may need to try.  If pain and swelling continue consider MRI or referral to orthopedics for further evaluation.    DDX: left shoulder strain, tendonitis, back strain   - XR SHOULDER LEFT G/E 2 VIEWS (Clinic Performed)  - ketorolac (TORADOL) 10 MG tablet; Take 1 tablet (10 mg) by mouth every 6 hours as needed for moderate pain      20 minutes spent on the date of the encounter doing chart review, review of test results, interpretation of tests, patient visit and documentation        Tobacco Cessation:   reports that he has never smoked. His smokeless tobacco use includes snuff.      See Patient Instructions    No follow-ups on file.    SHILO [facility] - RAJEEV Pappas is a 41 year old who presents for the following health issues     HPI     Musculoskeletal problem/pain  Onset/Duration: 2 weeks ago  Description  Location: left shoulder pain  Joint Swelling: no  Redness: no  Pain: YES- mild to moderate  Warmth: no  Intensity:  mild, moderate  Progression of Symptoms:  improving  Accompanying signs and symptoms:   Fevers: no  Numbness/tingling/weakness: no  History  Trauma to the area: possible  Recent illness:  no  Previous similar problem: no  Previous evaluation:  no  Precipitating or alleviating factors:  Aggravating factors include: bending arm and lifting   Therapies tried and outcome: nothing  He slipped and fell.  He was using a walking stick due to his ataxia and balance issue.  Pain is not consistent, but severe at times         Review of Systems   CONSTITUTIONAL:chronic poor balance   INTEGUMENTARY/SKIN: NEGATIVE for worrisome rashes, moles or lesions  RESP: NEGATIVE  for significant cough or SOB  CV: NEGATIVE for chest pain, palpitations or peripheral edema  MUSCULOSKELETAL: posterior left shoulder near the ball   NEURO: denies weakness into left arm      Objective    /80   Pulse 111   Temp 98  F (36.7  C) (Tympanic)   Wt 90.7 kg (200 lb)   SpO2 99%   BMI 27.12 kg/m    Body mass index is 27.12 kg/m .  Physical Exam   GENERAL: alert and no distress  RESP: regular non-labored  CV: regular rates, peripheral pulses strong and no peripheral edema  MS: normal range of motion, tenderness to palpation left upper back and swelling left upper back NEGATIVE for spinal tenderness  SKIN: no suspicious lesions or rashes  NEURO: Normal strength and tone, sensory exam grossly normal, mentation intact and equal hand grasp and sensation.   PSYCH: mentation appears normal, affect normal/bright    Results for orders placed or performed in visit on 03/24/21   XR SHOULDER LEFT G/E 2 VIEWS (Clinic Performed)     Status: None    Narrative    PROCEDURE:  XR SHOULDER LT G/E 3 VW    HISTORY: Acute pain of left shoulder    COMPARISON:  None.    TECHNIQUE:  4 views of the left shoulder were obtained.    FINDINGS:  No fracture or dislocation is identified. The joint spaces  are preserved.        Impression    IMPRESSION: Normal left shoulder    MARINO HAND MD

## 2021-03-24 ENCOUNTER — OFFICE VISIT (OUTPATIENT)
Dept: FAMILY MEDICINE | Facility: OTHER | Age: 41
End: 2021-03-24
Attending: NURSE PRACTITIONER
Payer: COMMERCIAL

## 2021-03-24 ENCOUNTER — ANCILLARY PROCEDURE (OUTPATIENT)
Dept: GENERAL RADIOLOGY | Facility: OTHER | Age: 41
End: 2021-03-24
Attending: NURSE PRACTITIONER
Payer: COMMERCIAL

## 2021-03-24 VITALS
TEMPERATURE: 98 F | DIASTOLIC BLOOD PRESSURE: 80 MMHG | SYSTOLIC BLOOD PRESSURE: 126 MMHG | BODY MASS INDEX: 27.12 KG/M2 | OXYGEN SATURATION: 99 % | HEART RATE: 111 BPM | WEIGHT: 200 LBS

## 2021-03-24 DIAGNOSIS — M25.512 ACUTE PAIN OF LEFT SHOULDER: Primary | ICD-10-CM

## 2021-03-24 PROCEDURE — 99213 OFFICE O/P EST LOW 20 MIN: CPT | Performed by: NURSE PRACTITIONER

## 2021-03-24 PROCEDURE — G0463 HOSPITAL OUTPT CLINIC VISIT: HCPCS

## 2021-03-24 PROCEDURE — 73030 X-RAY EXAM OF SHOULDER: CPT | Mod: TC,LT

## 2021-03-24 RX ORDER — KETOROLAC TROMETHAMINE 10 MG/1
10 TABLET, FILM COATED ORAL EVERY 6 HOURS PRN
Qty: 20 TABLET | Refills: 0 | Status: SHIPPED | OUTPATIENT
Start: 2021-03-24 | End: 2022-05-11

## 2021-03-24 ASSESSMENT — ANXIETY QUESTIONNAIRES
6. BECOMING EASILY ANNOYED OR IRRITABLE: NOT AT ALL
1. FEELING NERVOUS, ANXIOUS, OR ON EDGE: NOT AT ALL
5. BEING SO RESTLESS THAT IT IS HARD TO SIT STILL: NOT AT ALL
2. NOT BEING ABLE TO STOP OR CONTROL WORRYING: NOT AT ALL
4. TROUBLE RELAXING: NOT AT ALL
3. WORRYING TOO MUCH ABOUT DIFFERENT THINGS: NOT AT ALL
GAD7 TOTAL SCORE: 0
7. FEELING AFRAID AS IF SOMETHING AWFUL MIGHT HAPPEN: NOT AT ALL

## 2021-03-24 ASSESSMENT — PAIN SCALES - GENERAL: PAINLEVEL: NO PAIN (1)

## 2021-03-24 ASSESSMENT — PATIENT HEALTH QUESTIONNAIRE - PHQ9: SUM OF ALL RESPONSES TO PHQ QUESTIONS 1-9: 0

## 2021-03-24 NOTE — NURSING NOTE
Chief Complaint   Patient presents with     Musculoskeletal Problem       Initial /80   Pulse 111   Temp 98  F (36.7  C) (Tympanic)   Wt 90.7 kg (200 lb)   SpO2 99%   BMI 27.12 kg/m   Estimated body mass index is 27.12 kg/m  as calculated from the following:    Height as of 8/22/19: 1.829 m (6').    Weight as of this encounter: 90.7 kg (200 lb).  Medication Reconciliation: complete  Alison Abebe LPN

## 2021-03-24 NOTE — LETTER
My Depression Action Plan  Name: Mian Greenfield   Date of Birth 1980  Date: 3/23/2021    My doctor: Torsten Lawrence   My clinic: [facility] [place]  [facility] [address]  [phone]          GREEN    ZONE   Good Control    What it looks like:     Things are going generally well. You have normal ups and downs. You may even feel depressed from time to time, but bad moods usually last less than a day.   What you need to do:  1. Continue to care for yourself (see self care plan)  2. Check your depression survival kit and update it as needed  3. Follow your physician s recommendations including any medication.  4. Do not stop taking medication unless you consult with your physician first.           YELLOW         ZONE Getting Worse    What it looks like:     Depression is starting to interfere with your life.     It may be hard to get out of bed; you may be starting to isolate yourself from others.    Symptoms of depression are starting to last most all day and this has happened for several days.     You may have suicidal thoughts but they are not constant.   What you need to do:     1. Call your care team. Your response to treatment will improve if you keep your care team informed of your progress. Yellow periods are signs an adjustment may need to be made.     2. Continue your self-care.  Just get dressed and ready for the day.  Don't give yourself time to talk yourself out of it.    3. Talk to someone in your support network.    4. Open up your Depression Self-Care Plan/Wellness Kit.           RED    ZONE Medical Alert - Get Help    What it looks like:     Depression is seriously interfering with your life.     You may experience these or other symptoms: You can t get out of bed most days, can t work or engage in other necessary activities, you have trouble taking care of basic hygiene, or basic responsibilities, thoughts of suicide or death that will not go away,  self-injurious behavior.     What you need to do:  1. Call your care team and request a same-day appointment. If they are not available (weekends or after hours) call your local crisis line, emergency room or 911.          Depression Self-Care Plan / Wellness Kit    Many people find that medication and therapy are helpful treatments for managing depression. In addition, making small changes to your everyday life can help to boost your mood and improve your wellbeing. Below are some tips for you to consider. Be sure to talk with your medical provider and/or behavioral health consultant if your symptoms are worsening or not improving.     Sleep   Sleep hygiene  means all of the habits that support good, restful sleep. It includes maintaining a consistent bedtime and wake time, using your bedroom only for sleeping or sex, and keeping the bedroom dark and free of distractions like a computer, smartphone, or television.     Develop a Healthy Routine  Maintain good hygiene. Get out of bed in the morning, make your bed, brush your teeth, take a shower, and get dressed. Don t spend too much time viewing media that makes you feel stressed. Find time to relax each day.    Exercise  Get some form of exercise every day. This will help reduce pain and release endorphins, the  feel good  chemicals in your brain. It can be as simple as just going for a walk or doing some gardening, anything that will get you moving.      Diet  Strive to eat healthy foods, including fruits and vegetables. Drink plenty of water. Avoid excessive sugar, caffeine, alcohol, and other mood-altering substances.     Stay Connected with Others  Stay in touch with friends and family members.    Manage Your Mood  Try deep breathing, massage therapy, biofeedback, or meditation. Take part in fun activities when you can. Try to find something to smile about each day.     Psychotherapy  Be open to working with a therapist if your provider recommends it.      Medication  Be sure to take your medication as prescribed. Most anti-depressants need to be taken every day. It usually takes several weeks for medications to work. Not all medicines work for all people. It is important to follow-up with your provider to make sure you have a treatment plan that is working for you. Do not stop your medication abruptly without first discussing it with your provider.    Crisis Resources   These hotlines are for both adults and children. They and are open 24 hours a day, 7 days a week unless noted otherwise.      National Suicide Prevention Lifeline   0-273-694-TALK (3085)      Crisis Text Line    www.crisistextline.org  Text HOME to 062109 from anywhere in the [place], anytime, about any type of crisis. A live, trained crisis counselor will receive the text and respond quickly.      [place] Lifeline for LGBTQ Youth  A national crisis intervention and suicide lifeline for LGBTQ youth under 25. Provides a safe place to talk without judgement. Call 1-873.653.2719; text START to 016212 or visit www.thetrevorproject.org to talk to a trained counselor.      For [facility] crisis numbers, visit the [facility] website at:  https://[place].gov/dhs/people-we-serve/adults/health-care/mental-health/resources/crisis-contacts.jsp

## 2021-03-24 NOTE — PATIENT INSTRUCTIONS
Try using a consistent pain reliever   Such as the Toradol 2-3 times a day and/or Voltaren gel any pain cream multiple times a day     Patient Education     Toradol Oral Tablet 10 mg  Uses  For pain.  Instructions  Take the medicine with food.  Store at room temperature in a dry place. Do not keep in the bathroom.  Keep the medicine away from heat and light.  This medicine may cause you to become more sensitive to the sun. Use sunscreen or wear protective clothing when you are exposed to the sun.  Please tell your doctor and pharmacist about all the medicines you take. Include both prescription and over-the-counter medicines. Also tell them about any vitamins, herbal medicines, or anything else you take for your health.  If your symptoms do not improve or they worsen while on this medicine, contact your doctor.  Talk to your doctor before taking other medicines, including aspirins and ibuprofen containing products. Speak to your doctor about which medicines are safe to use while you are on this medicine.  It is very important that you follow your doctor's instructions for all blood tests.  Cautions  This medicine may cause serious bleeding problems in patients taking blood thinner medications. Follow your doctor's instructions carefully to monitor your blood lab tests if you are on blood thinners.  Tell your doctor and pharmacist if you ever had an allergic reaction to a medicine. Symptoms of an allergic reaction can include trouble breathing, skin rash, itching, swelling, or severe dizziness.  Some patients with weak hearts may have worsening of symptoms. If you notice difficulty breathing, weight gain, or swelling of your legs or ankles, let your doctor know right away.  This medicine is associated with a rare but very serious medical condition. Please speak with your doctor about symptoms you should look out for while on this medicine. Notify your doctor immediately if you develop those symptoms.  Some patients  taking this medicine have experienced serious side effects. Please speak with your doctor to understand the risks and benefits associated with this medicine.  This medicine may cause serious bleeding from the stomach or bowels. Stop this medicine and call your doctor immediately if you see any signs of bleeding. Bleeding can cause pain in the stomach, vomiting up liquid that looks like coffee grounds, and red or dark tarry stools.  There is an increased risk of bleeding while on this medicine, please tell your doctor or nurse if you notice any excessive bleeding or bruising.  This medicine is associated with an increased risk for serious blood clots. Speak with your doctor about the benefits and risks from using this medicine.  Do not use the medication any more than instructed.  Your ability to stay alert or to react quickly may be impaired by this medicine. Do not drive or operate machinery until you know how this medicine will affect you.  Speak with your doctor before taking any medicine with aspirin.  Please check with your doctor before drinking alcohol while on this medicine.  Contact your doctor if you notice a change in the amount or darkening of your urine.  Call the doctor if there are any signs of confusion or unusual changes in behavior.  Tell the doctor or pharmacist if you are pregnant, planning to be pregnant, or breastfeeding.  This medicine can hurt a new baby in the womb. If you become pregnant while on this medicine, tell your doctor immediately. Your doctor may switch you to a different medicine.  Ask your pharmacist if this medicine can interact with any of your other medicines. Be sure to tell them about all the medicines you take.  Please tell all your doctors and dentists that you are on this medicine before they provide care.  Do not start or stop any other medicines without first speaking to your doctor or pharmacist.  Call your doctor right away if you notice any unusual bleeding or  bruising.  If you have had a heart attack or a stroke within the past 6 months, talk to your doctor before using this medicine.  Do not share this medicine with anyone who has not been prescribed this medicine.  This medicine is associated with increased risk of death in certain patients. Please speak with your doctor about the benefits and risks of using this medicine.  This medicine can cause serious side effects in some patients. Important information from the [place] Food and Drug Administration (FDA) is available from your pharmacist. Please review it carefully with your pharmacist to understand the risks associated with this medicine.  Side Effects  The following is a list of some common side effects from this medicine. Please speak with your doctor about what you should do if you experience these or other side effects.    increased risk of bleeding    dizziness    drowsiness or sedation    swelling of the legs, feet, and hands    headaches    high blood pressure    nausea    stomach upset or abdominal pain    vomiting  Call your doctor or get medical help right away if you notice any of these more serious side effects:    bleeding that is severe or takes longer to stop    unusual bruising or discoloration on skin    chest pain    changes in memory, mood, or thinking    coughing up blood or vomit that looks like coffee grounds    ear problems (ringing in the ears, hearing loss)    swelling of the face, mouth, tongue or throat    fainting    flu-like symptoms    fast or irregular heart beats    jaw pain    kidney problems    symptoms of liver damage (such as yellowing of skin or eyes, dark urine, unusual tiredness or weakness; severe stomach or back pain)    feeling of numbness or tingling in your hands and feet    pale or blue skin, lips or fingernails    shortness of breath    severe stomach pain that spreads to the back    blood in stool    dark, tarry stool    symptoms of stroke (such as one-sided weakness,  slurred speech, confusion)    unusual or unexplained tiredness or weakness    urinating less often    blurring or changes of vision    severe or persistent vomiting    sudden or unexplained weight gain  A few people may have an allergic reactions to this medicine. Symptoms can include difficulty breathing, skin rash, itching, swelling, or severe dizziness. If you notice any of these symptoms, seek medical help quickly.  Extra  Please speak with your doctor, nurse, or pharmacist if you have any questions about this medicine.  [email or web address]  IMPORTANT NOTE: This document tells you briefly how to take your medicine, but it does not tell you all there is to know about it.Your doctor or pharmacist may give you other documents about your medicine. Please talk to them if you have any questions.Always follow their advice. There is a more complete description of this medicine available in English.Scan this code on your smartphone or tablet or use the web address below. You can also ask your pharmacist for a printout. If you have any questions, please ask your pharmacist.     2021 [employer].           Patient Education     Shoulder Pain with Uncertain Cause   Shoulder pain can have many causes. Pain often comes from the structures that surround the shoulder joint. These are the joint capsule, ligaments, tendons, muscles, and bursa. Pain can also come from cartilage in the joint. Cartilage can become worn out or injured. It s important to know what s causing your pain so the healthcare provider can use the correct treatment. But sometimes it s difficult to find the exact cause of shoulder pain. You may need to see a specialist (orthopedist). You may also need special tests such as a CT scan or MRI. The provider may need to use special tools to look inside the joint (arthroscopy).  Shoulder pain can be treated with a sling or a device that keeps your shoulder from moving. You can take  an anti-inflammatory medicine such as ibuprofen to ease pain. You may need to do special shoulder exercises. Follow up with a specialist if the pain is severe or doesn t go away after a few weeks.  Home care  Follow these tips when caring for yourself at home:    If a sling was given to you, leave it in place for the time advised by your healthcare provider. If you aren t sure how long to wear it, ask for advice. If the sling becomes loose, adjust it so that your forearm is level with the ground. Your shoulder should feel well supported.    Put an ice pack on the injured area for 20 minutes every 1 to 2 hours the first day. You can make your own ice pack by putting ice cubes in a plastic bag. Wrap the bag in a thin towel. Continue with ice packs 3 to 4 times a day for the next 2 days. Then use the pack as needed to ease pain and swelling.    You may use acetaminophen or ibuprofen to control pain, unless another pain medicine was prescribed. If you have chronic liver or kidney disease, talk with your healthcare provider before using these medicines. Also talk with your provider if you ve ever had a stomach ulcer or digestive bleeding.    Shoulder pain may seem worse at night, when there is less to distract you from the pain. If you sleep on your side, try to keep weight off your painful shoulder. Propping pillows behind you may stop you from rolling over onto that shoulder during sleep.     Shoulder and elbow joints can become stiff if left in a sling for too long. You should start range of motion exercises about 7 to 10 days after the injury. Talk with your provider to find out what type of exercises to do and how soon to start.    You can take the sling off to shower or bathe.  Follow-up care  Follow up with your healthcare provider if you don t start to get better in the next 5 days.  When to seek medical advice  Call your healthcare provider right away if any of these occur:    Pain or swelling gets worse or  continues for more than a few days    Your hand or fingers become cold, blue, numb, or tingly    Large amount of bruising on your shoulder or upper arm    Trouble moving your hand or fingers    Weakness in your hand or fingers    Your shoulder becomes stiff    It feels like your shoulder is popping out    You are less able to do your daily activities  Steve last reviewed this educational content on 1/1/2020 2000-2020 [employer]. All rights reserved. This information is not intended as a substitute for professional medical care. Always follow your healthcare professional's instructions.

## 2021-03-24 NOTE — LETTER
My Depression Action Plan  Name: Mian Greenfield   Date of Birth 1980  Date: 3/24/2021    My doctor: Torsten Lawrence   My clinic: [facility] [place]  [facility] [address]  [phone]          GREEN    ZONE   Good Control    What it looks like:     Things are going generally well. You have normal ups and downs. You may even feel depressed from time to time, but bad moods usually last less than a day.   What you need to do:  1. Continue to care for yourself (see self care plan)  2. Check your depression survival kit and update it as needed  3. Follow your physician s recommendations including any medication.  4. Do not stop taking medication unless you consult with your physician first.           YELLOW         ZONE Getting Worse    What it looks like:     Depression is starting to interfere with your life.     It may be hard to get out of bed; you may be starting to isolate yourself from others.    Symptoms of depression are starting to last most all day and this has happened for several days.     You may have suicidal thoughts but they are not constant.   What you need to do:     1. Call your care team. Your response to treatment will improve if you keep your care team informed of your progress. Yellow periods are signs an adjustment may need to be made.     2. Continue your self-care.  Just get dressed and ready for the day.  Don't give yourself time to talk yourself out of it.    3. Talk to someone in your support network.    4. Open up your Depression Self-Care Plan/Wellness Kit.           RED    ZONE Medical Alert - Get Help    What it looks like:     Depression is seriously interfering with your life.     You may experience these or other symptoms: You can t get out of bed most days, can t work or engage in other necessary activities, you have trouble taking care of basic hygiene, or basic responsibilities, thoughts of suicide or death that will not go away,  self-injurious behavior.     What you need to do:  1. Call your care team and request a same-day appointment. If they are not available (weekends or after hours) call your local crisis line, emergency room or 911.          Depression Self-Care Plan / Wellness Kit    Many people find that medication and therapy are helpful treatments for managing depression. In addition, making small changes to your everyday life can help to boost your mood and improve your wellbeing. Below are some tips for you to consider. Be sure to talk with your medical provider and/or behavioral health consultant if your symptoms are worsening or not improving.     Sleep   Sleep hygiene  means all of the habits that support good, restful sleep. It includes maintaining a consistent bedtime and wake time, using your bedroom only for sleeping or sex, and keeping the bedroom dark and free of distractions like a computer, smartphone, or television.     Develop a Healthy Routine  Maintain good hygiene. Get out of bed in the morning, make your bed, brush your teeth, take a shower, and get dressed. Don t spend too much time viewing media that makes you feel stressed. Find time to relax each day.    Exercise  Get some form of exercise every day. This will help reduce pain and release endorphins, the  feel good  chemicals in your brain. It can be as simple as just going for a walk or doing some gardening, anything that will get you moving.      Diet  Strive to eat healthy foods, including fruits and vegetables. Drink plenty of water. Avoid excessive sugar, caffeine, alcohol, and other mood-altering substances.     Stay Connected with Others  Stay in touch with friends and family members.    Manage Your Mood  Try deep breathing, massage therapy, biofeedback, or meditation. Take part in fun activities when you can. Try to find something to smile about each day.     Psychotherapy  Be open to working with a therapist if your provider recommends it.      Medication  Be sure to take your medication as prescribed. Most anti-depressants need to be taken every day. It usually takes several weeks for medications to work. Not all medicines work for all people. It is important to follow-up with your provider to make sure you have a treatment plan that is working for you. Do not stop your medication abruptly without first discussing it with your provider.    Crisis Resources   These hotlines are for both adults and children. They and are open 24 hours a day, 7 days a week unless noted otherwise.      National Suicide Prevention Lifeline   5-171-269-TALK (7818)      Crisis Text Line    www.crisistextline.org  Text HOME to 382821 from anywhere in the [place], anytime, about any type of crisis. A live, trained crisis counselor will receive the text and respond quickly.      [place] Lifeline for LGBTQ Youth  A national crisis intervention and suicide lifeline for LGBTQ youth under 25. Provides a safe place to talk without judgement. Call 1-992.974.9489; text START to 700269 or visit www.thetrevorproject.org to talk to a trained counselor.      For [facility] crisis numbers, visit the [facility] website at:  https://[place].gov/dhs/people-we-serve/adults/health-care/mental-health/resources/crisis-contacts.jsp

## 2021-03-25 ENCOUNTER — IMMUNIZATION (OUTPATIENT)
Dept: FAMILY MEDICINE | Facility: OTHER | Age: 41
End: 2021-03-25
Attending: FAMILY MEDICINE
Payer: COMMERCIAL

## 2021-03-25 PROCEDURE — 91300 PR COVID VAC PFIZER DIL RECON 30 MCG/0.3 ML IM: CPT

## 2021-03-25 ASSESSMENT — ANXIETY QUESTIONNAIRES: GAD7 TOTAL SCORE: 0

## 2021-03-26 ENCOUNTER — TELEPHONE (OUTPATIENT)
Dept: NEUROLOGY | Facility: CLINIC | Age: 41
End: 2021-03-26

## 2021-03-26 NOTE — TELEPHONE ENCOUNTER
Patient called and wanted to discuss what steps would need to be taken for him to get a service dog? Or would he just need a letter from Dr. Velázquez?

## 2021-04-07 ENCOUNTER — TELEPHONE (OUTPATIENT)
Dept: FAMILY MEDICINE | Facility: OTHER | Age: 41
End: 2021-04-07

## 2021-04-07 DIAGNOSIS — M25.512 LEFT SHOULDER PAIN, UNSPECIFIED CHRONICITY: Primary | ICD-10-CM

## 2021-04-07 NOTE — TELEPHONE ENCOUNTER
Patient would like a referral to [facility] for his shoulder. Referral pended. Alison Abebe LPN

## 2021-04-07 NOTE — TELEPHONE ENCOUNTER
Chief Complaint(s): Low back pain   Date of Injury: 06/09/2020  Initial Treatment Date: 06/09/2020  Date Last Seen: 06/09/2020  Mechanism of Onset: Normal activities  Occupation: Retired  Referred by: , Ambrose and Friend, Katie Jones  Primary Care Physician: Winifred Griffiths MD  Date informed consent signed: 06/19/2017  I have reviewed the past medical history, family history, social history, medications and allergies listed in the medical record as obtained by my nursing staff and support staff and agree with their documentation.  Evelin  reports that she has never smoked. She has never used smokeless tobacco.  Evelin is allergic to erythromycin and ciprofloxacin.  Advance Directive Status: Yes   Visit Number of Current Episode: 2  Discharged from care: No  Initial pain rating: 10/10    SUBJECTIVE:  \"Evelin\" is a pleasant 82 year old female that presents to our office today for continued care and treatment of reoccurrence of low back pain. Patient rates her pain today at 3/10 with 10 being worst. Patient states she is feeling a lot better. She is able to stand up straighter and doesn't have her walking stick for support. She does still have a little bit on her right side into her buttock at night.     OBJECTIVE FINDINGS:   Problem focus examination revealed:     (Lumbar and Pelvic Spine) Lumbar spine facet joint function is within normal limits except for her L5 facet joints (released on traction) that exhibited limited passive range of motion and segmental restriction and tenderness on palpation; Sacroiliac joint function is within normal limits except for her right Sacroiliac joint that exhibited limited passive range of motion and joint restriction; The following muscles were examined for flexibility and tone at rest; right and left hip flexor muscle; right and left hip adductor muscle; right and left hip rotator muscle; right and left piriformis muscle; right and left hamstring muscle; right and left Gluteus  Order signed    medius muscle and gluteus catalina muscle; right and left quadratus lumborum muscle; left and right Tensor fasciae latae muscle; left and right internal hip rotators muscle; right and left quadriceps muscle. These muscles were found to be within normal limits except for her right gluteus medius muscle and her bilateral lumbar paraspinal muscles that exhibited limited flexibility and were hypertonic at rest.    (Hip)  Range of motion is within normal limits.    Assessment:   1. Lumbar region somatic dysfunction    2. Acute bilateral low back pain without sciatica    3. Pelvic somatic dysfunction       Complicating Factors/Co-morbidities:   None noted.    Plan:  Patient was evaluated and then treated with manipulation to her lumbar and pelvic spine via chiropractic distraction technique, myofascial release method and activator technique to improve function and passive range of motion of facet joints. Patient also treated with contract/relax stretch to m noted as taut in objective findings to improve flexibility and decrease strain to spinal structures.     Goals of Care: Goal of care is to improve muscular and skeletal function and provide symptom relief.     Patient rates her pain post treatment at 1/10 with 10 being worst.    Patient is to return next week for continued care and treatment.     Length of Visit: 15 minutes    On 6/12/2020, Ariana FLETCHER CT scribed the services personally performed by Josiah Gurrola DC.    The documentation recorded by the scribe accurately and completely reflects the service(s) I personally performed and the decisions made by me.

## 2021-04-13 ENCOUNTER — IMMUNIZATION (OUTPATIENT)
Dept: FAMILY MEDICINE | Facility: OTHER | Age: 41
End: 2021-04-13
Attending: FAMILY MEDICINE
Payer: COMMERCIAL

## 2021-04-13 PROCEDURE — 0002A PR COVID VAC PFIZER DIL RECON 30 MCG/0.3 ML IM: CPT

## 2021-04-18 ENCOUNTER — HEALTH MAINTENANCE LETTER (OUTPATIENT)
Age: 41
End: 2021-04-18

## 2021-04-30 ENCOUNTER — VIRTUAL VISIT (OUTPATIENT)
Dept: NEUROLOGY | Facility: CLINIC | Age: 41
End: 2021-04-30
Payer: COMMERCIAL

## 2021-04-30 DIAGNOSIS — G11.4 HEREDITARY SPASTIC PARAPLEGIA (H): ICD-10-CM

## 2021-04-30 PROCEDURE — 99213 OFFICE O/P EST LOW 20 MIN: CPT | Mod: 95 | Performed by: PSYCHIATRY & NEUROLOGY

## 2021-04-30 RX ORDER — BACLOFEN 5 MG/1
10 TABLET ORAL 3 TIMES DAILY
Qty: 180 TABLET | Refills: 3 | Status: SHIPPED | OUTPATIENT
Start: 2021-04-30 | End: 2021-04-30

## 2021-04-30 RX ORDER — BACLOFEN 5 MG/1
TABLET ORAL
Qty: 180 TABLET | Refills: 3 | Status: SHIPPED | OUTPATIENT
Start: 2021-04-30 | End: 2021-10-01

## 2021-04-30 NOTE — PROGRESS NOTES
Department of Neurology  Movement Disorders Division    Telehealth  Evaluation     Patient: Mian Greenfield   MRN: [id number]   : 1980   Date of Visit: 2021    Mian Greenfield is a 41 year old male who is being evaluated via a billable telephone visit due to the Covid 19 pandemic.      History of Present Illness    Mr. Greenfield is a 42 yo man with ataxia, spasticity, and cerebellar atrophy.       Genetic testing was negative for HSP.  On initial evaluation upper and lower extremities showed diffuse hyperreflexia and lower extremity spasticity and some dysmetria of the upper extremities.  His gait was wide-based and ataxic.  MRI brain showed cerebellar atrophy.     Last clinic visit on 2021 , Baclofen was increased to 5 mg TID scheduled instead of PRN.     Interval hx:    Baclofen did help some  with muscles spasms , takes 5 mg TID , no side effects . Fell a couple of weeks ago, was placed on Ketorolac for shoulder pain but quit due to stomach pain .Ambulates independently.Using shopping cart helps with ambulation.  Can walk on his own for 50 yards.    He takes Keppra for seizures.    Is willing to do physical  therapy.    Medications:  Current Outpatient Medications   Medication Sig Dispense Refill     Baclofen 5 MG TABS Take 5 mg by mouth 3 times daily 1 tab three times a day (Patient not taking: Reported on 3/24/2021) 90 tablet 11     ketorolac (TORADOL) 10 MG tablet Take 1 tablet (10 mg) by mouth every 6 hours as needed for moderate pain 20 tablet 0     levETIRAcetam (KEPPRA) 500 MG tablet Take 2 tablets (1,000 mg) by mouth 2 times daily 360 tablet 11     multivitamin, therapeutic with minerals (THERA-VIT-M) TABS Take 1 tablet by mouth daily 30 each 0        Allergies: is allergic to bee venom.    Past Medical History:   Past Medical History:   Diagnosis Date     Alcohol abuse 10/3/2013     Insomnia 2015     Raynaud's syndrome 2011     Scoliosis (and kyphoscoliosis), idiopathic  04/03/2001     Seizure disorder (H) 7/29/2015    Dr chavez; Lamictal; after 2 years, can try tapering     Seizures (H)        Past Surgical History:   Past Surgical History:   Procedure Laterality Date     NO HISTORY OF SURGERY         Social History:   Social History     Socioeconomic History     Marital status: Single     Spouse name: Not on file     Number of children: Not on file     Years of education: Not on file     Highest education level: Not on file   Occupational History     Not on file   Social Needs     Financial resource strain: Not on file     Food insecurity     Worry: Not on file     Inability: Not on file     Transportation needs     Medical: Not on file     Non-medical: Not on file   Tobacco Use     Smoking status: Never Smoker     Smokeless tobacco: Current User     Types: Snuff   Substance and Sexual Activity     Alcohol use: No     Drug use: No     Sexual activity: Not Currently     Partners: Female   Lifestyle     Physical activity     Days per week: Not on file     Minutes per session: Not on file     Stress: Not on file   Relationships     Social connections     Talks on phone: Not on file     Gets together: Not on file     Attends Sikhism service: Not on file     Active member of club or organization: Not on file     Attends meetings of clubs or organizations: Not on file     Relationship status: Not on file     Intimate partner violence     Fear of current or ex partner: Not on file     Emotionally abused: Not on file     Physically abused: Not on file     Forced sexual activity: Not on file   Other Topics Concern     Parent/sibling w/ CABG, MI or angioplasty before 65F 55M? No      Service No     Blood Transfusions Yes     Comment: Permits if needed     Caffeine Concern Yes     Comment: 1 soda     Occupational Exposure No     Hobby Hazards No     Sleep Concern No     Stress Concern No     Weight Concern No     Special Diet No     Back Care No     Exercise No     Bike Helmet Not  Asked     Seat Belt Yes     Self-Exams Not Asked   Social History Narrative    HS graduate. No college. 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 One child who is 2 years old.        Family History:  Family History   Problem Relation Age of Onset     Depression Mother      Blood Disease Father         bacterial infection     Alcohol/Drug Father      Depression Father      Thyroid Disease Sister      Thyroid Disease Sister      Diabetes Sister      Heart Disease Maternal Grandfather           Impression:  Mian Greenfield is a 41 year old male with with ataxia, spasticity, and cerebellar atrophy.    Genetic testing was negative for HSP.    Recommendations:   1. Baclofen increased to 10 mg TID . If side effects then go to Baclofen 5 mg TID.    2. He is fully vaccinated for COVID19.    3. Return to clinic in 2 months       Randi Hayes MD  Movement Disorders Fellow    Discussed with Dr. Slaughter.    Phone call

## 2021-04-30 NOTE — LETTER
2021       RE: Mian Greenfield  [address] [place] [id number]     Dear Colleague,    Thank you for referring your patient, Mian Greenfield, to the [facility] Line [place] at [facility]. Please see a copy of my visit note below.    Department of Neurology  Movement Disorders Division    Telehealth  Evaluation     Patient: Mian Greenfield   MRN: [id number]   : 1980   Date of Visit: 2021    Mian Greenfield is a 41 year old male who is being evaluated via a billable telephone visit due to the Covid 19 pandemic.      History of Present Illness    Mr. Greenfield is a 42 yo man with ataxia, spasticity, and cerebellar atrophy.       Genetic testing was negative for HSP.  On initial evaluation upper and lower extremities showed diffuse hyperreflexia and lower extremity spasticity and some dysmetria of the upper extremities.  His gait was wide-based and ataxic.  MRI brain showed cerebellar atrophy.     Last clinic visit on 2021 , Baclofen was increased to 5 mg TID scheduled instead of PRN.     Interval hx:    Baclofen did help some  with muscles spasms , takes 5 mg TID , no side effects . Fell a couple of weeks ago, was placed on Ketorolac for shoulder pain but quit due to stomach pain .Ambulates independently.Using shopping cart helps with ambulation.  Can walk on his own for 50 yards.    He takes Keppra for seizures.    Is willing to do physical  therapy.    Medications:  Current Outpatient Medications   Medication Sig Dispense Refill     Baclofen 5 MG TABS Take 5 mg by mouth 3 times daily 1 tab three times a day (Patient not taking: Reported on 3/24/2021) 90 tablet 11     ketorolac (TORADOL) 10 MG tablet Take 1 tablet (10 mg) by mouth every 6 hours as needed for moderate pain 20 tablet 0     levETIRAcetam (KEPPRA) 500 MG tablet Take 2 tablets (1,000 mg) by mouth 2 times daily 360 tablet 11     multivitamin, therapeutic with minerals  (THERA-VIT-M) TABS Take 1 tablet by mouth daily 30 each 0        Allergies: is allergic to bee venom.    Past Medical History:   Past Medical History:   Diagnosis Date     Alcohol abuse 10/3/2013     Insomnia 7/29/2015     Raynaud's syndrome 01/31/2011     Scoliosis (and kyphoscoliosis), idiopathic 04/03/2001     Seizure disorder (H) 7/29/2015    Dr chavez; Lamictal; after 2 years, can try tapering     Seizures (H)        Past Surgical History:   Past Surgical History:   Procedure Laterality Date     NO HISTORY OF SURGERY         Social History:   Social History     Socioeconomic History     Marital status: Single     Spouse name: Not on file     Number of children: Not on file     Years of education: Not on file     Highest education level: Not on file   Occupational History     Not on file   Social Needs     Financial resource strain: Not on file     Food insecurity     Worry: Not on file     Inability: Not on file     Transportation needs     Medical: Not on file     Non-medical: Not on file   Tobacco Use     Smoking status: Never Smoker     Smokeless tobacco: Current User     Types: Snuff   Substance and Sexual Activity     Alcohol use: No     Drug use: No     Sexual activity: Not Currently     Partners: Female   Lifestyle     Physical activity     Days per week: Not on file     Minutes per session: Not on file     Stress: Not on file   Relationships     Social connections     Talks on phone: Not on file     Gets together: Not on file     Attends Religion service: Not on file     Active member of club or organization: Not on file     Attends meetings of clubs or organizations: Not on file     Relationship status: Not on file     Intimate partner violence     Fear of current or ex partner: Not on file     Emotionally abused: Not on file     Physically abused: Not on file     Forced sexual activity: Not on file   Other Topics Concern     Parent/sibling w/ CABG, MI or angioplasty before 65F 55M? 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        Family History:  Family History   Problem Relation Age of Onset     Depression Mother      Blood Disease Father         bacterial infection     Alcohol/Drug Father      Depression Father      Thyroid Disease Sister      Thyroid Disease Sister      Diabetes Sister      Heart Disease Maternal Grandfather           Impression:  Mian Greenfield is a 41 year old male with with ataxia, spasticity, and cerebellar atrophy.    Genetic testing was negative for HSP.    Recommendations:   1. Baclofen increased to 10 mg TID . If side effects then go to Baclofen 5 mg TID.    2. He is fully vaccinated for COVID19.    3. Return to clinic in 2 months       Randi Hayes MD  Movement Disorders Fellow    Discussed with Dr. Slaughter.    Phone call    Mian is a 41 year old who is being evaluated via a billable telephone visit.      What phone number would you like to be contacted at? [phone]  How would you like to obtain your AVS? Mychart    I have personally interviewedMr. Mian Greenfield and agree with diagnosis and management. The total time spent with the patient was 20 minutes, over 50% of the time spent in counseling and coordinating care.      Again, thank you for allowing me to participate in the care of your patient.      Sincerely,    Jacky Slaughter MD

## 2021-04-30 NOTE — PROGRESS NOTES
Mian is a 41 year old who is being evaluated via a billable telephone visit.      What phone number would you like to be contacted at? [phone]  How would you like to obtain your AVS? Hyphen 8hart

## 2021-05-06 NOTE — PROGRESS NOTES
I have personally interviewedMr. Mian Greenfield and agree with diagnosis and management. The total time spent with the patient was 20 minutes, over 50% of the time spent in counseling and coordinating care.

## 2021-05-11 ENCOUNTER — TELEPHONE (OUTPATIENT)
Dept: NEUROLOGY | Facility: CLINIC | Age: 41
End: 2021-05-11

## 2021-05-11 NOTE — TELEPHONE ENCOUNTER
Returned call to patient; states he has routine (every 6 Mo.) forms that need to be completed again.    Gave him the fax number and he will send them today or tomorrow.    Cristhian Osborn RN

## 2021-05-12 ENCOUNTER — DOCUMENTATION ONLY (OUTPATIENT)
Dept: NEUROLOGY | Facility: CLINIC | Age: 41
End: 2021-05-12

## 2021-05-12 ENCOUNTER — HOSPITAL ENCOUNTER (OUTPATIENT)
Dept: PHYSICAL THERAPY | Facility: HOSPITAL | Age: 41
Setting detail: THERAPIES SERIES
End: 2021-05-12
Attending: STUDENT IN AN ORGANIZED HEALTH CARE EDUCATION/TRAINING PROGRAM
Payer: COMMERCIAL

## 2021-05-12 PROCEDURE — 97112 NEUROMUSCULAR REEDUCATION: CPT | Mod: GP

## 2021-05-12 PROCEDURE — 97162 PT EVAL MOD COMPLEX 30 MIN: CPT | Mod: GP

## 2021-05-12 PROCEDURE — 97750 PHYSICAL PERFORMANCE TEST: CPT | Mod: GP

## 2021-05-12 NOTE — TELEPHONE ENCOUNTER
Received Medical Opinion Form via fax.  Form completed and forwarded to MD (MD is out until 5/18).

## 2021-05-12 NOTE — PROGRESS NOTES
Can do canines do application was mailed to patient home address that on patient chart, per Radha

## 2021-05-12 NOTE — PROGRESS NOTES
Initial Physical Therapy Evaluation      Name: Mian Greenfield MRN# [id number]   Age: 41 year old YOB: 1980     Date of Consultation: May 12, 2021  Primary care provider: Torsten Lawrence    Referring Physician: Dr. Hayes  Orders: Eval and Treat  Medical Diagnosis: hereditary spastic paraplegia  Onset of Illness/Injury: June 2016    Reason for PT Visit: Patient is a 40 y/o male who presents with cerebellar ataxia. Did have a significant seizure in June 2016 - reports that he has lost muscle control and has lack of coordination/balance with daily movement and activity. Has had multiple seizures since the first seizure.     Has used a walking stick in the past, but no longer uses it secondary to a recent fall and the shoulder. Did recently up his baclofen dosage - has not noticed any change at this point in his spasticity, but does have less cramping. Is also taking seizure medication which has been working well.    Consults with Dr. Slaughter and Dr. Hayes  Prior Level of Function: n/a   Pain: 2-8/10     Community Support/Living Environment/Employment History: disabled     Patient/Family Goal: Improve balance    Fall Screen:   Have you fallen 2 or more times in the last year? Yes  Have you fallen and had an injury in the last year? Yes  Timed up & go:   Is patient a fall risk? Yes    Past Medical History:   Past Medical History:   Diagnosis Date     Alcohol abuse 10/3/2013     Insomnia 7/29/2015     Raynaud's syndrome 01/31/2011     Scoliosis (and kyphoscoliosis), idiopathic 04/03/2001     Seizure disorder (H) 7/29/2015    Dr chavez; Lamictal; after 2 years, can try tapering     Seizures (H)        Past Surgical History:  Past Surgical History:   Procedure Laterality Date     NO HISTORY OF SURGERY         Medications:   Current Outpatient Medications   Medication Sig     Baclofen (LIORESAL) 5 MG tablet 2 tab three times a day     ketorolac (TORADOL) 10 MG tablet Take 1 tablet (10 mg) by mouth every 6  hours as needed for moderate pain (Patient not taking: Reported on 4/30/2021)     levETIRAcetam (KEPPRA) 500 MG tablet Take 2 tablets (1,000 mg) by mouth 2 times daily     multivitamin, therapeutic with minerals (THERA-VIT-M) TABS Take 1 tablet by mouth daily     No current facility-administered medications for this encounter.          Musculoskeletal Findings:   OBJECTIVE  Observation: Appears to PT in no acute distress.    Gait: unsteady   Assistive devices: no assistive devices    Posture: Slightly stooped posture.  Sitting Posture:   Standing Posture:  Correction of posture:     Neurological Assessment:  Reflexes - Right:  Patellar: 4+  Achilles: 4+    Reflexes - Left:  Patellar: 4+  Achilles: 4+    Right Lower Extremity Range of Motion: SLR to 40 degrees    Left Lower Extremity Range of Motion: SLR to 50 degrees    Strength:  Abdominal Strength: Fair   Right Lower Extremity Strength: 5/5 except 4/5 hip abduction, 4/5 hip extension, 4-/5 hip flexion  Left Lower Extremity Strength: 5/5 except 4+/5 hip abduction, 4+/5 hip flexion    Special Tests:  -deferred                  Outcome Measures:   Lincoln Balance Scale: 33/56    Prognosis/Plan of Care: Good  Appropriate for Physical Therapy Intervention: Yes     GOALS:   Short-term goals:  To be achieved in 2-3 weeks:    Instruct in home program  1.) Patient will be independent with a short-term home exercise program.  2.) Patient will demonstrate proper body/lifting mechanics with abdominal bracing without cueing.  3.) Patient will report a 25% or greater improvement in symptoms in order to allow for increased ability with activities of daily living.       Long-term goals:  To be achieved in 6-8 weeks:    Self management of symptoms  1.) Improve score on Lincoln Balance scale by 5 points or greater to allow increased stability with daily movement and activity.  2.) Patient will report a 50% or greater improvement in symptoms in order to allow for increased comfort with  activities of daily living    Discharge goals: Patient will be independent with home program for self-management of symptoms.      Planned Interventions: Therapeutic exercise, manual therapy, therapeutic activity, patient education, neuromuscular re education      Treatment Rendered/Intervention:  Evaluation completed as described above followed by discussion of exam findings and plan of care.    Therapeutic exercise: Patient instructed in and demonstrated proper performance of home exercise program consisting of:  Romberg eyes closed, modified tandem standing      Clinical Impressions:  Criteria for Skilled Therapeutic Intervention Met: Yes  PT Diagnosis: LE weakness and balance deficit  Influenced by the following impairments: weakness, decreased mobility, lack of stability with functional movement  Functional limitations due to impairment: fall risk, difficulty with ambulation, standing and walking for lengthy periods of time  Clinical presentation: Evolving/Changing  Clinical presentation rationale: clinical judgement  Clinical Decision making (complexity): Moderate Complexity  Predicted Duration of Therapy Intervention (days/wks): 8-10 weeks  Risks and Benefits of therapy have been explained: Yes  Patient, Family & other staff in agreement with plan of care: Yes  Comments: Patient would benefit from continued PT sessions primarily working on stability and balance/proprioceptive training.      Total Evaluation Time: 23 minutes

## 2021-05-20 NOTE — TELEPHONE ENCOUNTER
Medical opinion form faxed to [place] [facility] at [phone].    Copy mailed to patient and call placed to inform him it had been faxed in.    Cristhian Osborn RN

## 2021-06-10 ENCOUNTER — ANCILLARY PROCEDURE (OUTPATIENT)
Dept: GENERAL RADIOLOGY | Facility: OTHER | Age: 41
End: 2021-06-10
Attending: NURSE PRACTITIONER
Payer: COMMERCIAL

## 2021-06-10 ENCOUNTER — OFFICE VISIT (OUTPATIENT)
Dept: FAMILY MEDICINE | Facility: OTHER | Age: 41
End: 2021-06-10
Attending: NURSE PRACTITIONER
Payer: COMMERCIAL

## 2021-06-10 VITALS
DIASTOLIC BLOOD PRESSURE: 82 MMHG | SYSTOLIC BLOOD PRESSURE: 115 MMHG | OXYGEN SATURATION: 98 % | BODY MASS INDEX: 26.18 KG/M2 | HEART RATE: 105 BPM | WEIGHT: 193 LBS | TEMPERATURE: 99.3 F
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
## 2021-06-10 DIAGNOSIS — R20.2 NUMBNESS AND TINGLING OF RIGHT THUMB: ICD-10-CM

## 2021-06-10 DIAGNOSIS — M79.639 FOREARM PAIN: ICD-10-CM

## 2021-06-10 DIAGNOSIS — R20.0 NUMBNESS AND TINGLING OF RIGHT THUMB: ICD-10-CM

## 2021-06-10 PROCEDURE — G0463 HOSPITAL OUTPT CLINIC VISIT: HCPCS

## 2021-06-10 PROCEDURE — 73090 X-RAY EXAM OF FOREARM: CPT | Mod: TC,RT,FY

## 2021-06-10 PROCEDURE — 73130 X-RAY EXAM OF HAND: CPT | Mod: TC,RT,FY

## 2021-06-10 PROCEDURE — 99213 OFFICE O/P EST LOW 20 MIN: CPT | Performed by: NURSE PRACTITIONER

## 2021-06-10 ASSESSMENT — PAIN SCALES - GENERAL: PAINLEVEL: NO PAIN (0)

## 2021-06-10 NOTE — NURSING NOTE
Chief Complaint   Patient presents with     Musculoskeletal Problem     rt tumb issues        Initial /82 (BP Location: Right arm, Patient Position: Chair, Cuff Size: Adult Large)   Pulse 105   Temp 99.3  F (37.4  C) (Tympanic)   Wt 87.5 kg (193 lb)   SpO2 98%   BMI 26.18 kg/m   Estimated body mass index is 26.18 kg/m  as calculated from the following:    Height as of 8/22/19: 1.829 m (6').    Weight as of this encounter: 87.5 kg (193 lb).  Medication Reconciliation: complete  Randi Adler LPN

## 2021-06-10 NOTE — PATIENT INSTRUCTIONS
"  Patient Education     Paraesthesias  Paraesthesia is a burning or prickling sensation that is sometimes felt in the hands, arms, legs or feet. It can also occur in other parts of the body. It can also feel like tingling or numbness, skin crawling, or itching. The feeling is not comfortable, but it is not painful. (The \"pins and needles\" feeling that happens when a foot or hand \"falls asleep\" is a temporary paraesthesia.)  Paraesthesias that last or come and go may be caused by medical issues that need to be treated. These include stroke, a bulging disk pressing on a nerve, a trapped nerve, vitamin deficiencies, uncontrolled diabetes, alcohol abuse, or even certain medicines.  Tests are often done. These tests may include blood tests, X-ray, CT (computerized tomography) scan, nerve conduction studies (NCS), or a muscle test (electromyography). Depending on the cause, treatment may include physical therapy.  Home care    Tell your healthcare provider about all medicines you take. This includes prescription and over-the-counter medicines, vitamins, and herbs. Ask if any of the medicines may be causing your problems. Don't make any changes to prescription medicines without talking to your healthcare provider first.    You may be prescribed medicines to help relieve the tingling feeling or for pain. Take all medicines as directed.    A numb hand or foot may be more prone to injury. To help protect it:  ? Always use oven mitts.  ? Test water with an unaffected hand or foot.  ? Use caution when trimming nails. File sharp areas.  ? Wear shoes that fit well to avoid pressure points, blisters, and ulcers.  ? Inspect your hands and feet carefully (including the soles of your feet and between your toes) daily. If you see red areas, sores, or other problems, tell your healthcare provider.  Follow-up care  Follow up with your doctor, or as advised. You may need further testing or evaluation.     When to seek medical " advice  Call your healthcare provider right away if any of the following occur:    Numbness or weakness of the face, one arm, or one leg    Slurred speech, confusion, trouble speaking, walking, or seeing    Severe headache, fainting spell, dizziness, or seizure    Chest, arm, neck, or upper back pain    Loss of bladder or bowel control    Open wound with redness, swelling, or pus     Steve last reviewed this educational content on 4/1/2018 2000-2021 [employer]. All rights reserved. This information is not intended as a substitute for professional medical care. Always follow your healthcare professional's instructions.

## 2021-06-10 NOTE — PROGRESS NOTES
Assessment & Plan   Problem List Items Addressed This Visit     None      Visit Diagnoses     Fall, initial encounter    -  Primary    Relevant Orders    NEUROLOGY ADULT REFERRAL      Numbness and tingling of right thumb      I see no visible injury from this most recent fall. However, he does have a history of other falls and would like better idnetify where the numbness/tingling is coming from (specific nerve vs his neurologic disorder). We discussed the option for physical therapy after results are in from the EMG. He will let the clinic know if he would like to go this route.     Relevant Orders    NEUROLOGY ADULT REFERRAL - EMG only. He is established with neurology in at [facility] but wants to have this testing done closer to home, which is very reasonable.                 No follow-ups on file.    Sulma Hayes, CNP  [facility] [place] JOS Pappas is a 41 year old who presents for the following health issues     HPI     Concern - Rt thumb issue  Onset: Little over a week ago (unsure of date) after stumbled due to his cerebellar atrophy and ataxia and caught himself with his right hand and hurt right thumb. Ataxia is being followed by Dr. Slaughter at [place] in the [facility]. He reports that he does fall at times due to this condition.    Intensity: mild  Progression of Symptoms:  worsening and constant  Accompanying Signs & Symptoms: shoots pain into right wrist   Previous history of similar problem: broke right hand years ago  Precipitating factors:        Worsened by: by extending arm  Alleviating factors:        Improved by: nothing   Therapies tried and outcome:  none     Review of Systems   Constitutional, HEENT, cardiovascular, pulmonary, gi and gu systems are negative, except as otherwise noted.      Objective    /82 (BP Location: Right arm, Patient Position: Chair, Cuff Size: Adult Large)   Pulse 105   Temp 99.3  F (37.4  C) (Tympanic)   Wt 87.5  kg (193 lb)   SpO2 98%   BMI 26.18 kg/m    Body mass index is 26.18 kg/m .     Physical Exam   GENERAL: healthy, alert and no distress  MS: Right upper extremity: no gross musculoskeletal defects noted, no edema, erythema, or heat. No tenderness to shoulder, elbow, wrist, or hand. Full mobility of this extremity preserved. Sensation is grossly intact to light touch.   SKIN: no suspicious lesions or rashes    Results for orders placed or performed in visit on 06/10/21   XR Forearm Right 2 Views     Status: None    Narrative    PROCEDURE:  XR FOREARM RIGHT 2 VIEWS    HISTORY: Fall; Forearm pain    COMPARISON:  None.    TECHNIQUE:  2 views of the right forearm were obtained.    FINDINGS:  No fracture or dislocation is identified. The joint spaces  are preserved.        Impression    IMPRESSION: No acute fracture.      MARINO HAND MD   Results for orders placed or performed in visit on 06/10/21   XR Hand Right G/E 3 Views     Status: None    Narrative    PROCEDURE:  XR HAND RT G/E 3 VW    HISTORY: Fall    COMPARISON:  None.    TECHNIQUE:  3 views of the left hand were obtained.    FINDINGS:  No fracture or dislocation is identified. The joint spaces  are preserved.        Impression    IMPRESSION: No acute fracture.      MARINO HAND MD

## 2021-06-24 ENCOUNTER — TRANSFERRED RECORDS (OUTPATIENT)
Dept: HEALTH INFORMATION MANAGEMENT | Facility: CLINIC | Age: 41
End: 2021-06-24

## 2021-06-30 ENCOUNTER — DOCUMENTATION ONLY (OUTPATIENT)
Dept: FAMILY MEDICINE | Facility: OTHER | Age: 41
End: 2021-06-30

## 2021-06-30 NOTE — PROGRESS NOTES
EMG from Dr. Shamar Hernandez reviewed. Injury to right superficial radial nerve which is expected to be self limiting. Dr. Hernandez notes no surgical intervention is required. I expect this to resolve over then next month or two. If it does not resolve, Mian can follow up in clinic at that time

## 2021-08-25 ENCOUNTER — TELEPHONE (OUTPATIENT)
Dept: NEUROLOGY | Facility: CLINIC | Age: 41
End: 2021-08-25

## 2021-08-25 DIAGNOSIS — R27.9 LACK OF COORDINATION: ICD-10-CM

## 2021-08-25 DIAGNOSIS — G11.4 HEREDITARY SPASTIC PARAPLEGIA (H): ICD-10-CM

## 2021-08-25 DIAGNOSIS — G40.909 SEIZURE DISORDER (H): ICD-10-CM

## 2021-08-25 DIAGNOSIS — R56.9 SEIZURES (H): ICD-10-CM

## 2021-08-25 NOTE — TELEPHONE ENCOUNTER
[place] INPATIENT ENCOUNTER   GYN ADMISSION NOTE    ADMISSION DATE:  10/7/2017  ADMITTING PHYSICIAN:  Paty Zarco MD  ATTENDING PHYSICIAN:  Paty Zarco MD  PRIMARY CARE PHYSICIAN:  Xenia Jeffries DO    CODE STATUS:  Full Resuscitation    CHIEF COMPLAINT/REASON FOR ADMISSION:  Postpartum fever 10 days post op primary  section on 2017     HISTORY OF PRESENT ILLNESS:    Olinda Colon is a 37 year old female presenting with fever of 101 and lower abdominal pain today. Patient had a primary  section for failure of descent on 2017. Patient felt well till today.     MEDICATIONS PRIOR TO ADMISSION:    Prescriptions Prior to Admission   Medication Sig Dispense Refill   • acetaminophen (TYLENOL) 500 MG tablet Take 1,000 mg by mouth every 6 hours as needed for Pain.     • hydroCORTisone (CORTIZONE) 1 % cream Apply 1 application topically 2 times daily as needed for Itching or Rash (abdomen).     • nystatin-triamcinolone (MYCOLOG II) 350903-9.1 UNIT/GM-% cream Apply 1 application topically 2 times daily.     • docusate sodium-sennosides (SENOKOT S) 50-8.6 MG per tablet Take 2 tablets by mouth daily as needed for Constipation. 30 tablet 0   • ibuprofen (MOTRIN) 600 MG tablet Take 1 tablet by mouth every 6 hours as needed for Pain. 30 tablet 0   • Prenatal Vit-Fe Fumarate-FA (MULTIVITAMIN & MINERAL W/FOLIC ACID- PRENATAL) 27-1 MG Tab Take 2 tablets by mouth daily.      • oxyCODONE/APAP (PERCOCET) 5-325 MG per tablet Take 1-2 tablets by mouth every 4 hours as needed for Pain. 30 tablet 0       ALLERGIES:    ALLERGIES:   Allergen Reactions   • Minocycline HIVES       PAST MEDICAL HISTORY:    Past Medical History:   Diagnosis Date   • Abnormal Papanicolaou smear of cervix with positive human papilloma virus (HPV) test    • Genital warts    • Migraine     hormonal with periods   • Plantar wart        SURGICAL HISTORY:    Past Surgical History:   Procedure Laterality Date   • GANGLION CYST  [facility]    Phone Message    May a detailed message be left on voicemail: yes     Reason for Call: Medication Refill Request    Has the patient contacted the pharmacy for the refill? Yes   Name of medication being requested: levETIRAcetam (KEPPRA) 500 MG tablet [75280] (Order [id number])  Provider who prescribed the medication: Anthony Velázquez MD  Pharmacy: [address]  Date medication is needed: ASAP      Action Taken: Message routed to:  [facility] Neurology    Travel Screening: Not Applicable                                                                       EXCISION Left        FAMILY HISTORY:    Family History   Problem Relation Age of Onset   • Hypertension Father    • Lung Cancer Maternal Grandfather    • Lung Cancer Paternal Grandmother    • Heart attack Paternal Grandfather    • Cancer Other      neg breast, uterine, ovarian, colon CA       SOCIAL HISTORY:    Social History   Substance Use Topics   • Smoking status: Never Smoker   • Smokeless tobacco: Never Used   • Alcohol use No      Comment: social; none since pregnancy confirmed.       REVIEW OF SYSTEMS:    All other systems are reviewed and are negative except as documented in the history of present illness.  .  Skin: Negative for rash.  HEENT: Negative for eye drainage, rhinorrhea, ear pain or sore throat.  Respiratory: Negative cough, wheezing or shortness of breath.  Cardiovascular: Negative for chest pain, chest pressure, palpitations or diaphoresis.  Gastrointestinal: Negative for nausea, vomiting, diarrhea .  Genitourinary: Negative for dysuria, urgency, frequency, hematuria or flank pain.  Extremities:  Negative for joint swelling or joint pain.  Neurologic:  Negative for change in sensory or motor function.  Negative for headache.  Endocrine: Negative for heat or cold intolerance, weight loss or gain.  Hematological: Negative for bleeding, bruising or adenopathy.  Psychiatric: Negative for change in affect, change in mentation or sleep disturbance.      PHYSICAL EXAM:    VITAL SIGNS:    Vital Last Value 24 Hour Range   Temperature (!) 102.8 °F (39.3 °C) (Nurse notiifed) (10/07/17 2228) Temp  Min: 100.2 °F (37.9 °C)  Max: 102.8 °F (39.3 °C)   Pulse 80 (10/07/17 2228) Pulse  Min: 80  Max: 109   Respiratory 16 (10/07/17 2228) Resp  Min: 16  Max: 18   Non-Invasive  Blood Pressure 120/78 (10/07/17 2228) BP  Min: 120/78  Max: 137/91   Pulse Oximetry 97 % (10/07/17 2228) SpO2  Min: 96 %  Max: 100 %     Vital Today Admitted   Weight 70.3 kg (10/07/17 1807) Weight: 70.3 kg (10/07/17 1807)   Height N/A Height:  5' 4\" (162.6 cm) (10/07/17 1807)   Body Mass Index N/A BMI (Calculated): 26.66 (10/07/17 1807)     Vital Signs:    Visit Vitals  /78 ([place] [facility] [place] Supine)   Pulse 80   Temp (!) 102.8 °F (39.3 °C) (Oral) Comment: Nurse notiifed   Resp 16   Ht 5' 4\" (1.626 m)   Wt 70.3 kg   LMP 2017   SpO2 97%   BMI 26.61 kg/m²     Pulse Ox Interpretation:  Within normal limits.  General:  Alert, cooperative, conversive.  Skin:  Warm and dry without rash.    Head:  Normocephalic-atraumatic.   Neck:  Trachea is midline. No adenopathy.  Normal thyroid without mass or tenderness..   Eyes:  Normal conjunctivae and sclerae.  Pupils equal, round, reactive to light.  Extraocular movements intact.  ENT:  Mucous membranes are moist.  Normal tympanic membranes and external auditory canals bilaterally.  No pharyngeal erythema or exudate.  No facial tenderness.  Normal nasal mucosa.  Cardiovascular:  Symmetrical pulses.  Regular, rate and rhythm without murmur.  Respiratory:  Normal respiratory effort.  Clear to auscultation.  No wheezes, rales or rhonchi.  Breast exam- No evidence of mastitis, breasts mildly engorged  Gastrointestinal:  Soft and nontender.  Normal bowel sounds.  No hepatomegaly or splenomegaly. Mild tenderness in lower pelvis, but no rebound.  section scar intact without erythema or drainage.  Musculoskeletal:  No deformity or edema.  No tenderness to palpation. No evidence of DVT clinically.  Back:   Normal alignment.  No costovertebral angle tenderness or midline bony tenderness.  Neurologic:   Oriented times 4.  Cranial nerves 2-12 are intact.  No focal deficits or lateralizing signs.  Psychiatric:   Cooperative.  Appropriate mood and affect.        LABORATORY DATA:    All pertinent laboratory results were reviewed and pertinent positives are elevated WBC.    IMAGING STUDIES:    Imaging studies reviewed.  CT of abdomen and pelvis negative    EKG INTERPRETATION:  No results found for  this or any previous visit.    ASSESSMENT:    Postpartum endometritis    PLAN:    I have reviewed and updated the problem list relative to my area of specialty.  There are no active hospital problems to display for this patient.       Admit for IV antibiotic therapy    Admission Requirements and Anticipated Length of Stay:  The patient IS expected to require a two midnight stay in the hospital.  Admission is required to provide services and treatment only available in the hospital.  Admission is supported by the followin.  The documented complex medical factors and comorbidities.       2.  The severity of signs and symptoms.       3.  The current and anticipated ongoing medical needs.       4.  The potential risk for an adverse event or outcome.    MAINTENANCE THERAPIES:    DEEP VENOUS THROMBOSIS PROPHYLAXIS:    No mechanical VTE prophylaxis due to Patient fully ambulating and deemed to be low risk  No pharmacologic Venous Thromboembolism prophylaxis due to Active Bleeding / Risk of Bleeding    GASTROINTESTINAL PROPHYLAXIS:  Tolerating diet, no prophylaxis required.   NUTRITION:  Tolerating diet.

## 2021-08-26 RX ORDER — LEVETIRACETAM 500 MG/1
1000 TABLET ORAL 2 TIMES DAILY
Qty: 360 TABLET | Refills: 5 | Status: SHIPPED | OUTPATIENT
Start: 2021-08-26 | End: 2021-10-01

## 2021-08-26 NOTE — TELEPHONE ENCOUNTER
Refill requested for: Levetiracetam 500 mg    Last ordered: 7-6-20    Last Filled:     Last Clinic Visit: 7-6-20    Next Clinic Visit: none scheduled    Labs: 7-8-20: Levetiracetam = 29    From last visit note:   He has not had seizures for multiple years.  He has not been using alcohol.  On my last visit, his lactic acid was slightly elevated at 3.0.  His ceruloplasmin has been on the low side, so we repeated that, as well as a serum copper, which was upper ranges are normal.  He has not had any physical therapy.  He is concerned about his safety.  We will refer him to [facility] for physical therapy.     Action taken:   6 mo refill sent and messaged scheduling to set up appointment within next 6 mo.    Cristhian Osborn RN

## 2021-08-27 ENCOUNTER — TELEPHONE (OUTPATIENT)
Dept: NEUROLOGY | Facility: CLINIC | Age: 41
End: 2021-08-27
Payer: COMMERCIAL

## 2021-08-27 NOTE — TELEPHONE ENCOUNTER
----- Message from Cristhian Osborn RN sent at 8/27/2021  2:21 PM CDT -----  Regarding: RE: Needs appt within next 6 months  Sorry! He needs to see Fiol - a virtual visit would do.    Thanks  Cristhian Osborn RN     ----- Message -----  From: Harvey Harris  Sent: 8/27/2021   2:13 PM CDT  To: Cristhian Osborn RN  Subject: RE: Needs appt within next 6 months              Hi,    What provider does he need to see?    Thanks  ----- Message -----  From: Cristhian Osborn RN  Sent: 8/26/2021   3:29 PM CDT  To: [id number]  Subject: Needs appt within next 6 months                  Please contact patient and set up appointment for within next six months.    Thanks  Cristhian Osborn RN

## 2021-10-01 ENCOUNTER — TELEPHONE (OUTPATIENT)
Dept: NEUROLOGY | Facility: CLINIC | Age: 41
End: 2021-10-01

## 2021-10-01 ENCOUNTER — LAB (OUTPATIENT)
Dept: LAB | Facility: OTHER | Age: 41
End: 2021-10-01
Payer: COMMERCIAL

## 2021-10-01 ENCOUNTER — VIRTUAL VISIT (OUTPATIENT)
Dept: NEUROLOGY | Facility: CLINIC | Age: 41
End: 2021-10-01
Payer: COMMERCIAL

## 2021-10-01 DIAGNOSIS — G40.909 SEIZURE DISORDER (H): ICD-10-CM

## 2021-10-01 DIAGNOSIS — R27.0 ATAXIA: Primary | ICD-10-CM

## 2021-10-01 DIAGNOSIS — G11.4 HEREDITARY SPASTIC PARAPLEGIA (H): ICD-10-CM

## 2021-10-01 DIAGNOSIS — R56.9 SEIZURES (H): ICD-10-CM

## 2021-10-01 DIAGNOSIS — R27.9 LACK OF COORDINATION: ICD-10-CM

## 2021-10-01 DIAGNOSIS — R27.0 ATAXIA: ICD-10-CM

## 2021-10-01 PROCEDURE — 99214 OFFICE O/P EST MOD 30 MIN: CPT | Mod: 95 | Performed by: PSYCHIATRY & NEUROLOGY

## 2021-10-01 PROCEDURE — 80177 DRUG SCRN QUAN LEVETIRACETAM: CPT | Mod: ZL

## 2021-10-01 PROCEDURE — 36415 COLL VENOUS BLD VENIPUNCTURE: CPT | Mod: ZL

## 2021-10-01 RX ORDER — BACLOFEN 5 MG/1
TABLET ORAL
Qty: 180 TABLET | Refills: 11 | Status: SHIPPED | OUTPATIENT
Start: 2021-10-01 | End: 2021-10-15

## 2021-10-01 RX ORDER — LEVETIRACETAM 500 MG/1
1000 TABLET ORAL 2 TIMES DAILY
Qty: 360 TABLET | Refills: 11 | Status: SHIPPED | OUTPATIENT
Start: 2021-10-01 | End: 2022-05-11

## 2021-10-01 NOTE — PROGRESS NOTES
Mian is a 41 year old who is being evaluated via a billable video visit.      How would you like to obtain your AVS? Mail a copy  If the video visit is dropped, the invitation should be resent by: Send to e-mail at:   Video-Visit Details x 30 min    Type of service:  Video Visit    [facility] [place]     Platform used for Video Visit: Carly

## 2021-10-01 NOTE — LETTER
10/1/2021       RE: Mian Greenfield  [address] [facility] [address]     Dear Colleague,    Thank you for referring your patient, Mian Greenfield, to the [facility] [place] [facility]. Please see a copy of my visit note below.    Service Date: 10/01/2021    Torsten Lawrence MD  [facility]  [address] [place]  [address]    RE:  Mian Greenfield  MRN:  [id number]  :  1980    Dear Dr. Lawrence:    Mr. 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  He feels that his walking and steadiness has deteriorated since the last time he was seen.  He has had no injuries except there was an injury to the right upper extremity and he said a nerve has been affected.  Other than that, he says his walking has deteriorated.  He has had no seizures.  There is no alcohol involved.  No significant depression he talks about.  He says he is using a stick to walk and apparently that is one of the reasons for the fall.    Dr. Slaughter has done a workup and he has had cerebellar atrophy on the MRI.  He was tried on baclofen 10 mg t.i.d. which helped, but the patient discontinued.  He had a history of seizures, not had for a long time, and seizures have been treated with 1000 mg of levetiracetam twice a day with good concentrations. He has had EEGs in the past, not since 2017, and at that time was a procedure in [place], as he lives [place] away from here and this was a normal EEG.    His last MRI was  and probably needs a repeat in the near future.    PHYSICAL EXAMINATION:  On exam today over the video shows blood pressure 115/82.  He is moderately ataxic with some propensity to fall.  Mild tremor in finger-to-nose testing, more on the left than on the right.  His speech is moderately  dysarthric.  Reports some difficulty with swallowing.  We will send him a diet for that.    He says his spasticity has increased since he has been off the baclofen and we reinstituted that at 10 mg t.i.d.    Next time he sees us will be a face-to-face visit after the spring as he lives in [place] which is 4 hours away.  We will review the case with our genetics and with Dr. Slaughter and see if there is anything new.    Thank you for referring him to Neurology at the [place].  He is a very friendly, nice and unfortunate man.    Sincerely,    Anthony Velázquez MD

## 2021-10-01 NOTE — PROGRESS NOTES
Service Date: 10/01/2021    Torsten Lawrence MD  [facility]  [address] [place]  [address]    RE:  Mian Greenfield  MRN:  [id number]  :  1980    Dear Dr. Lawrence:    Mr. 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  He feels that his walking and steadiness has deteriorated since the last time he was seen.  He has had no injuries except there was an injury to the right upper extremity and he said a nerve has been affected.  Other than that, he says his walking has deteriorated.  He has had no seizures.  There is no alcohol involved.  No significant depression he talks about.  He says he is using a stick to walk and apparently that is one of the reasons for the fall.    Dr. Slaughter has done a workup and he has had cerebellar atrophy on the MRI.  He was tried on baclofen 10 mg t.i.d. which helped, but the patient discontinued.  He had a history of seizures, not had for a long time, and seizures have been treated with 1000 mg of levetiracetam twice a day with good concentrations. He has had EEGs in the past, not since 2017, and at that time was a procedure in [place], as he lives 4 hours away from here and this was a normal EEG.    His last MRI was  and probably needs a repeat in the near future.    PHYSICAL EXAMINATION:  On exam today over the video shows blood pressure 115/82.  He is moderately ataxic with some propensity to fall.  Mild tremor in finger-to-nose testing, more on the left than on the right.  His speech is moderately dysarthric.  Reports some difficulty with swallowing.  We will send him a diet for that.    He says his spasticity has increased since he has been off the baclofen and we reinstituted that at 10 mg t.i.d.    Next time he sees us will be a face-to-face visit after the spring as he lives in [place] which is 4  hours away.  We will review the case with our genetics and with Dr. Slaughter and see if there is anything new.    Thank you for referring him to Neurology at the [place].  He is a very friendly, nice and unfortunate man.    Sincerely,    Anthony Velázquez MD        D: 10/01/2021   T: 10/01/2021   MT: alessandra    Name:     KARENA BOWMAN  MRN:      [id number]        Account:      [id number]   :      1980           Service Date: 10/01/2021       Document: [id number]

## 2021-10-03 ENCOUNTER — HEALTH MAINTENANCE LETTER (OUTPATIENT)
Age: 41
End: 2021-10-03

## 2021-10-04 LAB — LEVETIRACETAM SERPL-MCNC: 26 UG/ML

## 2021-10-08 DIAGNOSIS — R27.0 ATAXIA: Primary | ICD-10-CM

## 2022-03-14 ENCOUNTER — TELEPHONE (OUTPATIENT)
Dept: NEUROLOGY | Facility: CLINIC | Age: 42
End: 2022-03-14

## 2022-03-14 NOTE — TELEPHONE ENCOUNTER
What is the concern that needs to be addressed by a nurse? Pt called asking about a form, please call back.     May a detailed message be left on voicemail? yes    Date of last office visit: 10/01/21    Message routed to: pamela nickerson

## 2022-03-17 NOTE — TELEPHONE ENCOUNTER
Returned call to patient on 3/14/22.  Patient indicated he had a DMV Form that needed to be filled out.    Gave patient fax number and asked him to send it to  [facility].    Mian indicated he would try to fax it the next day.    Cristhian Osborn RN

## 2022-04-20 ENCOUNTER — TELEPHONE (OUTPATIENT)
Dept: NEUROLOGY | Facility: CLINIC | Age: 42
End: 2022-04-20
Payer: COMMERCIAL

## 2022-04-20 NOTE — TELEPHONE ENCOUNTER
Received phone call from patient through [facility] in regard to Loss of Consciousness Form that patient had previously faxed in but could not be found.  Patient call to let me know that he had re-faxed it to my attention.   Form was now found in e-mail and completed, and then faxed to [facility] for signature by his new Epilepsy provider.    Cristhian Osborn RN

## 2022-04-22 ENCOUNTER — TELEPHONE (OUTPATIENT)
Dept: NEUROLOGY | Facility: CLINIC | Age: 42
End: 2022-04-22
Payer: COMMERCIAL

## 2022-04-25 NOTE — TELEPHONE ENCOUNTER
[place] Department of Vehicle Safety Loss of Consciousness or Voluntary Control form was signed by provider. It was sent to DMV by fax and mail. A copy was mailed to the patient.

## 2022-05-11 ENCOUNTER — VIRTUAL VISIT (OUTPATIENT)
Dept: NEUROLOGY | Facility: CLINIC | Age: 42
End: 2022-05-11
Payer: COMMERCIAL

## 2022-05-11 DIAGNOSIS — R56.9 SEIZURES (H): ICD-10-CM

## 2022-05-11 DIAGNOSIS — R27.9 LACK OF COORDINATION: ICD-10-CM

## 2022-05-11 DIAGNOSIS — G40.909 SEIZURE DISORDER (H): ICD-10-CM

## 2022-05-11 DIAGNOSIS — G11.4 HEREDITARY SPASTIC PARAPLEGIA (H): ICD-10-CM

## 2022-05-11 PROCEDURE — 99215 OFFICE O/P EST HI 40 MIN: CPT | Mod: 95 | Performed by: PSYCHIATRY & NEUROLOGY

## 2022-05-11 RX ORDER — LEVETIRACETAM 500 MG/1
1000 TABLET ORAL 2 TIMES DAILY
Qty: 360 TABLET | Refills: 11 | Status: SHIPPED | OUTPATIENT
Start: 2022-05-11 | End: 2023-06-07

## 2022-05-11 NOTE — PROGRESS NOTES
Mian is a 42 year old who is being evaluated via a billable video visit.      Video Start Time: 3:13 PM  Video-Visit Details    Type of service:  Video Visit    Video End Time:3:35 PM    Originating Location (pt. Location): Home    Distant Location (provider location):  [facility]UROLOG[facility]     Platform used for Video Visit: Carly Vidales

## 2022-05-11 NOTE — PROGRESS NOTES
Service Date: 05/11/2022    CHIEF COMPLAINT:  Follow up for seizures.    HISTORY OF PRESENT ILLNESS:  This patient is a 42-year-old right-handed male with a history of multiple medical problems including scoliosis, Raynaud's syndrome, seizure disorder, cerebellar ataxia.  He presented today for a follow-up for seizures.  This is a video conference call.  The patient was previously seen by Dr. Velázquez, and Dr. Velázquez recently retired.    According to the patient, his seizures started about 4 years ago and he had a total of 4 seizures in his life, all around 4 years ago.  He has only 1 type of seizure.  All were reported generalized tonic-clonic seizures.  He had no auras before the seizures.  He has been taking Keppra 1000 mg twice daily since then and he remains seizure-free for the past 4 years.    He is compliant with the medications.  No side effects were reported.    The patient is followed by Dr. Slaughter from Neurology for cerebellar ataxia, of which the etiology is unclear.  His ataxia started about 4 years ago.    TRIGGERS FOR SEIZURES:  Unclear.    RISK FACTORS FOR SEIZURES:  He had no history of head trauma with loss of consciousness.  No history of CNS infection.  No history of febrile convulsions.    ALLERGIES:  Bee venom.    PAST MEDICAL HISTORY:  Scoliosis and kyphoscoliosis, idiopathic, Raynaud's syndrome, alcohol abuse, seizure disorder, cerebellar ataxia.    PAST SURGICAL HISTORY:  None.    FAMILY HISTORY:  No family history of seizures or ataxia.    SOCIAL HISTORY:  He is currently disabled because of ataxia.  He worked as an  in the past.  He graduated from college with a bachelor's degree.    No smoking, no alcohol, no drugs.  He chews tobacco.    REVIEW OF SYSTEMS:  Positive for ataxia, otherwise negative.    PHYSICAL EXAMINATION:  Alert and oriented x3.  Speech fluent and appropriate.  Hearing normal.  No facial weakness.    CURRENT MEDICATIONS:  1.  Keppra 1000 mg twice daily.    PAST  ANTI-SEIZURE MEDICATIONS:  None.    PREVIOUS DIAGNOSTIC TESTING:  MRI scan of the brain on 2019 showed mild widening of the cerebellar folia consistent with cerebellar atrophy.  EEG on 2017 from [place] showed normal EEG.    IMPRESSION:  1.  Epilepsy.  This patient is a 42-year-old right-handed male with history of seizures in 2017.  He had a total of 4 seizures in his lifetime, all generalized tonic-clonic seizures.  He has been taking Keppra 1000 mg twice daily and he has been seizure-free for the past 4 years.    He is compliant with the medications.  No side effects were reported.  He has no auras before the seizures.    At present time, the cause of the seizures is unclear.  We will continue the current treatment.  He might need the Keppra treatment for life.    2.  Cerebellar ataxia, followed by Dr. Slaughter.    PLAN:  1.  Continue Keppra 1000 mg twice daily.  2.  Follow up with Dr. Slaughter for his cerebellar ataxia.  3.  Return to clinic in 1 year.    If the patient continued to have seizures, we may need to increase Keppra.  Other options are lamotrigine, Vimpat, etc.    Brandon Mariano MD      40 min total time was spent on the day of this visit.      22 min was spent on face to face time  8 min was spent on preparation of visit to review charts and labs, ordering medications and tests  10 min was spent on documentation of clinical information        D: 2022   T: 2022   MT: al    Name:     KARENA BOWMAN  MRN:      [id number]        Account:      [id number]   :      1980           Service Date: 2022       Document: [id number]

## 2022-05-11 NOTE — PATIENT INSTRUCTIONS
Times of Days am  pm        Medication Tablet Size Number of Tablets/Capsules Total Daily Dosage    Keppra 500 2 2       2000 mg                                                                                                                                   Carry this with you at all times.  CONTINUE TAKING YOUR OTHER MEDICATIONS AS PREVIOUSLY DIRECTED.      * * *Do not store medications in the bathroom.  Keep medications away from children!* * *

## 2022-05-11 NOTE — LETTER
5/11/2022       RE: Mian Greenfield  [address] [place] [id number]     Dear Colleague,    Thank you for referring your patient, Mian Greenfield, to the [facility] [place] at [facility]. Please see a copy of my visit note below.      Service Date: 05/11/2022    CHIEF COMPLAINT:  Follow up for seizures.    HISTORY OF PRESENT ILLNESS:  This patient is a 42-year-old right-handed male with a history of multiple medical problems including scoliosis, Raynaud's syndrome, seizure disorder, cerebellar ataxia.  He presented today for a follow-up for seizures.  This is a video conference call.  The patient was previously seen by Dr. Velázquez, and Dr. Velázquez recently retired.    According to the patient, his seizures started about 4 years ago and he had a total of 4 seizures in his life, all around 4 years ago.  He has only 1 type of seizure.  All were reported generalized tonic-clonic seizures.  He had no auras before the seizures.  He has been taking Keppra 1000 mg twice daily since then and he remains seizure-free for the past 4 years.    He is compliant with the medications.  No side effects were reported.    The patient is followed by Dr. Slaughter from Neurology for cerebellar ataxia, of which the etiology is unclear.  His ataxia started about 4 years ago.    TRIGGERS FOR SEIZURES:  Unclear.    RISK FACTORS FOR SEIZURES:  He had no history of head trauma with loss of consciousness.  No history of CNS infection.  No history of febrile convulsions.    ALLERGIES:  Bee venom.    PAST MEDICAL HISTORY:  Scoliosis and kyphoscoliosis, idiopathic, Raynaud's syndrome, alcohol abuse, seizure disorder, cerebellar ataxia.    PAST SURGICAL HISTORY:  None.    FAMILY HISTORY:  No family history of seizures or ataxia.    SOCIAL HISTORY:  He is currently disabled because of ataxia.  He worked as an  in the past.  He graduated from college with a bachelor's  degree.    No smoking, no alcohol, no drugs.  He chews tobacco.    REVIEW OF SYSTEMS:  Positive for ataxia, otherwise negative.    PHYSICAL EXAMINATION:  Alert and oriented x3.  Speech fluent and appropriate.  Hearing normal.  No facial weakness.    CURRENT MEDICATIONS:  1.  Keppra 1000 mg twice daily.    PAST ANTI-SEIZURE MEDICATIONS:  None.    PREVIOUS DIAGNOSTIC TESTING:  MRI scan of the brain on 2019 showed mild widening of the cerebellar folia consistent with cerebellar atrophy.  EEG on 2017 from [place] showed normal EEG.    IMPRESSION:  1.  Epilepsy.  This patient is a 42-year-old right-handed male with history of seizures in 2017.  He had a total of 4 seizures in his lifetime, all generalized tonic-clonic seizures.  He has been taking Keppra 1000 mg twice daily and he has been seizure-free for the past 4 years.    He is compliant with the medications.  No side effects were reported.  He has no auras before the seizures.    At present time, the cause of the seizures is unclear.  We will continue the current treatment.  He might need the Keppra treatment for life.    2.  Cerebellar ataxia, followed by Dr. Slaughter.    PLAN:  1.  Continue Keppra 1000 mg twice daily.  2.  Follow up with Dr. Slaughter for his cerebellar ataxia.  3.  Return to clinic in 1 year.    If the patient continued to have seizures, we may need to increase Keppra.  Other options are lamotrigine, Vimpat, etc.    Brandon Mariano MD      40 min total time was spent on the day of this visit.      22 min was spent on face to face time  8 min was spent on preparation of visit to review charts and labs, ordering medications and tests  10 min was spent on documentation of clinical information        D: 2022   T: 2022   MT: al    Name:     KARENA BOWMAN  MRN:      [id number]        Account:      [id number]   :      1980           Service Date: 2022       Document: [id number]

## 2022-05-14 ENCOUNTER — HEALTH MAINTENANCE LETTER (OUTPATIENT)
Age: 42
End: 2022-05-14

## 2022-09-04 ENCOUNTER — HEALTH MAINTENANCE LETTER (OUTPATIENT)
Age: 42
End: 2022-09-04

## 2023-06-03 ENCOUNTER — HEALTH MAINTENANCE LETTER (OUTPATIENT)
Age: 43
End: 2023-06-03

## 2023-06-05 DIAGNOSIS — G40.909 SEIZURE DISORDER (H): ICD-10-CM

## 2023-06-05 DIAGNOSIS — G11.4 HEREDITARY SPASTIC PARAPLEGIA (H): ICD-10-CM

## 2023-06-05 DIAGNOSIS — R27.9 LACK OF COORDINATION: ICD-10-CM

## 2023-06-05 DIAGNOSIS — R56.9 SEIZURES (H): ICD-10-CM

## 2023-06-05 NOTE — TELEPHONE ENCOUNTER
Notified Dr. Howard that pt had 24 beat run of v-tach. Pt asymptomatic. Resting in chair.    What is the concern that needs to be addressed by a nurse? Patient needs a refill on keppra medication, patient is all out of medication     May a detailed message be left on voicemail? yes    Date of last office visit:     Message routed to: mincep

## 2023-06-07 DIAGNOSIS — G11.4 HEREDITARY SPASTIC PARAPLEGIA (H): ICD-10-CM

## 2023-06-07 DIAGNOSIS — G40.909 SEIZURE DISORDER (H): ICD-10-CM

## 2023-06-07 DIAGNOSIS — R56.9 SEIZURES (H): ICD-10-CM

## 2023-06-07 DIAGNOSIS — R27.9 LACK OF COORDINATION: ICD-10-CM

## 2023-06-07 RX ORDER — LEVETIRACETAM 500 MG/1
1000 TABLET ORAL 2 TIMES DAILY
Qty: 360 TABLET | Refills: 0 | Status: SHIPPED | OUTPATIENT
Start: 2023-06-07 | End: 2023-09-07

## 2023-06-07 RX ORDER — LEVETIRACETAM 500 MG/1
1000 TABLET ORAL 2 TIMES DAILY
Qty: 360 TABLET | Refills: 11 | OUTPATIENT
Start: 2023-06-07

## 2023-06-07 NOTE — TELEPHONE ENCOUNTER
What is the concern that needs to be addressed by a nurse?   Patient is completely out of medication and is asking it to be filled today.     May a detailed message be left on voicemail? yes    Date of last office visit:     Message routed to: mincep

## 2023-09-07 ENCOUNTER — VIRTUAL VISIT (OUTPATIENT)
Dept: NEUROLOGY | Facility: CLINIC | Age: 43
End: 2023-09-07
Payer: COMMERCIAL

## 2023-09-07 VITALS — BODY MASS INDEX: 27.09 KG/M2 | WEIGHT: 200 LBS | HEIGHT: 72 IN

## 2023-09-07 DIAGNOSIS — R56.9 SEIZURES (H): ICD-10-CM

## 2023-09-07 DIAGNOSIS — G11.4 HEREDITARY SPASTIC PARAPLEGIA (H): ICD-10-CM

## 2023-09-07 DIAGNOSIS — G40.909 SEIZURE DISORDER (H): ICD-10-CM

## 2023-09-07 DIAGNOSIS — R27.9 LACK OF COORDINATION: ICD-10-CM

## 2023-09-07 DIAGNOSIS — R25.2 MUSCLE CRAMPS: Primary | ICD-10-CM

## 2023-09-07 RX ORDER — CYCLOBENZAPRINE HCL 10 MG
10 TABLET ORAL 3 TIMES DAILY PRN
Qty: 90 TABLET | Refills: 5 | Status: SHIPPED | OUTPATIENT
Start: 2023-09-07

## 2023-09-07 RX ORDER — LEVETIRACETAM 500 MG/1
1000 TABLET ORAL 2 TIMES DAILY
Qty: 360 TABLET | Refills: 3 | Status: SHIPPED | OUTPATIENT
Start: 2023-09-07

## 2023-09-07 ASSESSMENT — PAIN SCALES - GENERAL: PAINLEVEL: MODERATE PAIN (4)

## 2023-09-07 NOTE — PROGRESS NOTES
CHIEF COMPLAINT:  Follow up for seizures.    HISTORY OF PRESENT ILLNESS:  Video call for follow up. This patient is a 42-year-old right-handed male with a history of multiple medical problems including scoliosis, Raynaud's syndrome, seizure disorder, cerebellar ataxia.  He presented today for a follow-up for seizures.  This is a video conference call.  The patient was previously seen by Dr. Velázquez, and Dr. Velázquez recently retired.    Since the last visit about 16 months ago, he had no seizures. His last seizure was around 2017. He is still on keppra 100 mg bid. No side effects were reported. He is compliant with the medications.    According to the patient, his seizures started about 4 years ago and he had a total of 4 seizures in his life, all around 4 years ago.  He has only 1 type of seizure.  All were reported generalized tonic-clonic seizures.  He had no auras before the seizures.  He has been taking Keppra 1000 mg twice daily since then and he remains seizure-free for the past 4 years.    The patient is followed by Dr. Slaughter from Neurology for cerebellar ataxia, of which the etiology is unclear.  His ataxia started about 4 years ago.    TRIGGERS FOR SEIZURES:  Unclear.    RISK FACTORS FOR SEIZURES:  He had no history of head trauma with loss of consciousness.  No history of CNS infection.  No history of febrile convulsions.    ALLERGIES:  Bee venom.    PAST MEDICAL HISTORY:  Scoliosis and kyphoscoliosis, idiopathic, Raynaud's syndrome, alcohol abuse, seizure disorder, cerebellar ataxia.    PAST SURGICAL HISTORY:  None.    FAMILY HISTORY:  No family history of seizures or ataxia.    SOCIAL HISTORY:  He is currently disabled because of ataxia.  He worked as an  in the past.  He graduated from college with a bachelor's degree.    No smoking, no alcohol, no drugs.  He chews tobacco.    REVIEW OF SYSTEMS:  Positive for ataxia, otherwise negative.    PHYSICAL EXAMINATION:  Alert and oriented x3.  Speech  slow, mild dysarthria.  Hearing normal.  Mild right eye drooping.    CURRENT MEDICATIONS:  1.  Keppra 1000 mg twice daily.    PAST ANTI-SEIZURE MEDICATIONS:  None.    PREVIOUS DIAGNOSTIC TESTING:  MRI scan of the brain on 08/26/2019 showed mild widening of the cerebellar folia consistent with cerebellar atrophy.  EEG on 03/28/2017 from [place] showed normal EEG.    IMPRESSION:  1.  Epilepsy.  This patient is a 42-year-old right-handed male with history of seizures in 2017.  He had a total of 4 seizures in his lifetime, all generalized tonic-clonic seizures.      Since the last visit about 16 months ago, he had no seizures. His last seizure was around 2017. He is still on keppra 100 mg bid. No side effects were reported. He is compliant with the medications.    2.  Cerebellar ataxia, followed by Dr. Slaughter.    3.  Right eye drooping. He is not sure how long he had this. Need to be evaluated face to face.    4.  Muscle cramps at night.  This will wake him up.    PLAN:  1.  Continue Keppra 1000 mg twice daily.  2.  Follow up with Dr. Slaughter for his cerebellar ataxia.  3.  Follow up with PMD for right eye drooping.  3.  Return to clinic in 1 year.    If the patient continued to have seizures, we may need to increase Keppra.  Other options are Lamotrigine, Vimpat, etc.      30 min total time was spent on the day of this visit.      18 min was spent on face to face time  12 min was spent on preparation of visit to review charts and labs, ordering medications and tests, and documentation of clinical information

## 2023-09-07 NOTE — NURSING NOTE
Unable to complete PHQ-9 due to time restraint.     Is the patient currently in the state of [place]? YES    Visit mode:VIDEO    If the visit is dropped, the patient can be reconnected by: VIDEO VISIT: Text to cell phone:   Telephone Information:   Mobile [phone]       Will anyone else be joining the visit? NO  (If patient encounters technical issues they should call [phone]    How would you like to obtain your AVS? MyChart    Are changes needed to the allergy or medication list? No    Reason for visit: RECHECK    Medication and allergies have been reviewed.     Geovanni DAMONF

## 2023-09-07 NOTE — PATIENT INSTRUCTIONS
Times of Days am  pm        Medication Tablet Size Number of Tablets/Capsules Total Daily Dosage    Keppra 500 2 2       2000 mg    Flexeril as needed 10                                                                                                                          Carry this with you at all times.  CONTINUE TAKING YOUR OTHER MEDICATIONS AS PREVIOUSLY DIRECTED.      * * *Do not store medications in the bathroom.  Keep medications away from children!* * *         PLAN:  1.  Continue Keppra 1000 mg twice daily.  2.  Follow up with Dr. Slaughter for his cerebellar ataxia.  3.  Follow up with PMD for right eye drooping.  3.  Return to clinic in 1 year.

## 2023-09-07 NOTE — LETTER
2023       RE: Mian Greenfield  : 1980   MRN: [id number]      Dear Colleague,    Thank you for referring your patient, Mian Greenfield, to the [facility] at [facility]. Please see a copy of my visit note below.    Virtual Visit Details    Type of service:  Video Visit   Video Start Time: 4:40 PM  Video End Time: 4:58 PM    Originating Location (pt. Location): Home    Distant Location (provider location):  On-site  Platform used for Video Visit: Carly    CHIEF COMPLAINT:  Follow up for seizures.    HISTORY OF PRESENT ILLNESS:  Video call for follow up. This patient is a 42-year-old right-handed male with a history of multiple medical problems including scoliosis, Raynaud's syndrome, seizure disorder, cerebellar ataxia.  He presented today for a follow-up for seizures.  This is a video conference call.  The patient was previously seen by Dr. Velázquez, and Dr. Velázquez recently retired.    Since the last visit about 16 months ago, he had no seizures. His last seizure was around 2017. He is still on keppra 100 mg bid. No side effects were reported. He is compliant with the medications.    According to the patient, his seizures started about 4 years ago and he had a total of 4 seizures in his life, all around 4 years ago.  He has only 1 type of seizure.  All were reported generalized tonic-clonic seizures.  He had no auras before the seizures.  He has been taking Keppra 1000 mg twice daily since then and he remains seizure-free for the past 4 years.    The patient is followed by Dr. Slaughter from Neurology for cerebellar ataxia, of which the etiology is unclear.  His ataxia started about 4 years ago.    TRIGGERS FOR SEIZURES:  Unclear.    RISK FACTORS FOR SEIZURES:  He had no history of head trauma with loss of consciousness.  No history of CNS infection.  No history of febrile convulsions.    ALLERGIES:  Bee venom.    PAST MEDICAL HISTORY:   Scoliosis and kyphoscoliosis, idiopathic, Raynaud's syndrome, alcohol abuse, seizure disorder, cerebellar ataxia.    PAST SURGICAL HISTORY:  None.    FAMILY HISTORY:  No family history of seizures or ataxia.    SOCIAL HISTORY:  He is currently disabled because of ataxia.  He worked as an  in the past.  He graduated from college with a bachelor's degree.    No smoking, no alcohol, no drugs.  He chews tobacco.    REVIEW OF SYSTEMS:  Positive for ataxia, otherwise negative.    PHYSICAL EXAMINATION:  Alert and oriented x3.  Speech slow, mild dysarthria.  Hearing normal.  Mild right eye drooping.    CURRENT MEDICATIONS:  1.  Keppra 1000 mg twice daily.    PAST ANTI-SEIZURE MEDICATIONS:  None.    PREVIOUS DIAGNOSTIC TESTING:  MRI scan of the brain on 08/26/2019 showed mild widening of the cerebellar folia consistent with cerebellar atrophy.  EEG on 03/28/2017 from [place] showed normal EEG.    IMPRESSION:  1.  Epilepsy.  This patient is a 42-year-old right-handed male with history of seizures in 2017.  He had a total of 4 seizures in his lifetime, all generalized tonic-clonic seizures.      Since the last visit about 16 months ago, he had no seizures. His last seizure was around 2017. He is still on keppra 100 mg bid. No side effects were reported. He is compliant with the medications.    2.  Cerebellar ataxia, followed by Dr. Slaughter.    3.  Right eye drooping. He is not sure how long he had this. Need to be evaluated face to face.    4.  Muscle cramps at night.  This will wake him up.    PLAN:  1.  Continue Keppra 1000 mg twice daily.  2.  Follow up with Dr. Slaughter for his cerebellar ataxia.  3.  Follow up with PMD for right eye drooping.  3.  Return to clinic in 1 year.    If the patient continued to have seizures, we may need to increase Keppra.  Other options are Lamotrigine, Vimpat, etc.      30 min total time was spent on the day of this visit.      18 min was spent on face to face time  12 min was spent  on preparation of visit to review charts and labs, ordering medications and tests, and documentation of clinical information        Again, thank you for allowing me to participate in the care of your patient.      Sincerely,    Brandon Mariano MD

## 2023-09-07 NOTE — PROGRESS NOTES
Virtual Visit Details    Type of service:  Video Visit   Video Start Time: 4:40 PM  Video End Time: 4:58 PM    Originating Location (pt. Location): Home    Distant Location (provider location):  On-site  Platform used for Video Visit: Carly

## 2024-07-07 ENCOUNTER — HEALTH MAINTENANCE LETTER (OUTPATIENT)
Age: 44
End: 2024-07-07

## 2025-07-13 ENCOUNTER — HEALTH MAINTENANCE LETTER (OUTPATIENT)
Age: 45
End: 2025-07-13